# Patient Record
Sex: FEMALE | Race: WHITE | NOT HISPANIC OR LATINO | Employment: PART TIME | URBAN - METROPOLITAN AREA
[De-identification: names, ages, dates, MRNs, and addresses within clinical notes are randomized per-mention and may not be internally consistent; named-entity substitution may affect disease eponyms.]

---

## 2017-10-14 ENCOUNTER — HOSPITAL ENCOUNTER (EMERGENCY)
Facility: HOSPITAL | Age: 22
Discharge: HOME | End: 2017-10-14
Attending: EMERGENCY MEDICINE | Admitting: EMERGENCY MEDICINE

## 2017-10-14 ENCOUNTER — APPOINTMENT (OUTPATIENT)
Dept: GENERAL RADIOLOGY | Facility: HOSPITAL | Age: 22
End: 2017-10-14

## 2017-10-14 VITALS
RESPIRATION RATE: 16 BRPM | SYSTOLIC BLOOD PRESSURE: 114 MMHG | BODY MASS INDEX: 20.89 KG/M2 | WEIGHT: 130 LBS | HEART RATE: 78 BPM | DIASTOLIC BLOOD PRESSURE: 72 MMHG | HEIGHT: 66 IN | OXYGEN SATURATION: 100 % | TEMPERATURE: 98 F

## 2017-10-14 DIAGNOSIS — S83.92XA SPRAIN OF LEFT KNEE, UNSPECIFIED LIGAMENT, INITIAL ENCOUNTER: Primary | ICD-10-CM

## 2017-10-14 PROCEDURE — 73560 X-RAY EXAM OF KNEE 1 OR 2: CPT

## 2017-10-14 PROCEDURE — 99284 EMERGENCY DEPT VISIT MOD MDM: CPT

## 2017-10-14 RX ORDER — HYDROCODONE BITARTRATE AND ACETAMINOPHEN 7.5; 325 MG/1; MG/1
1 TABLET ORAL ONCE
Status: COMPLETED | OUTPATIENT
Start: 2017-10-14 | End: 2017-10-14

## 2017-10-14 RX ORDER — TRAZODONE HYDROCHLORIDE 100 MG/1
100 TABLET ORAL NIGHTLY
COMMUNITY
End: 2023-03-10

## 2017-10-14 RX ORDER — CLONAZEPAM 1 MG/1
1 TABLET ORAL 3 TIMES DAILY PRN
COMMUNITY
End: 2023-03-10

## 2017-10-14 RX ORDER — NORETHINDRONE ACETATE AND ETHINYL ESTRADIOL AND FERROUS FUMARATE 5-7-9-7
KIT ORAL DAILY
COMMUNITY
End: 2023-03-10

## 2017-10-14 RX ADMIN — HYDROCODONE BITARTRATE AND ACETAMINOPHEN 1 TABLET: 7.5; 325 TABLET ORAL at 12:34

## 2017-10-14 NOTE — DISCHARGE INSTRUCTIONS
Ibuprofen as needed for pain  Rest,ice, elevate and wear knee immobilizer for 3-5 days  Follow up with Orthopedic md of choice in 5-7 days if symptoms not improving  Return to er for increased pain/swelling or any new or worsening symptoms

## 2017-10-14 NOTE — ED PROVIDER NOTES
"Pt presents to the ED complaining of medial left knee pain, which began last night while at work. Pt states that she heard a \"pop\" in her left knee at the time. Pt's L knee XR shows nothing acute. On exam, the pt has no ligamentous laxity or left knee effusion but there is tenderness along the medial joint line of the left knee. I agree with the plan to discharge the pt home in a knee immobilizer.    I supervised care provided by the midlevel provider.    We have discussed this patient's history, physical exam, and treatment plan.   I have reviewed the note and personally saw and examined the patient and agree with the plan of care.    Documentation assistance provided by kevin Tucker for Dr. Beasley.  Information recorded by the venuibe was done at my direction and has been verified and validated by me.       Desi Tucker  10/14/17 5751       Imer Beasley MD  10/14/17 2342    "

## 2017-10-14 NOTE — ED PROVIDER NOTES
"  EMERGENCY DEPARTMENT ENCOUNTER    CHIEF COMPLAINT  Chief Complaint: L knee pain  History given by:Pt  History limited by:Nothing  Room Number: 03/03  PMD: Julianna Pollock MD      HPI:  Pt is a 22 y.o. female who presents with L knee pain secondary falling off of a mechanical bull last night. She reports she heard a \"pop\" after she fell but was able to ambulate afterwards. After helping about three people onto the mechanical bull which involved people standing on her L leg, she reports she heard another \"pop\". Pt reports her pain is exacerbated with applying weight on her L leg and movement.  Patient denies lower extremity numbness or tingling or any other symptoms at this time.  Past Medical History of anxiety and depression.    Duration: Last night  Timing:sudden  Location:L knee  Radiation:none  Quality:\"pain\"  Intensity/Severity:moderate  Progression:unchanged  Associated Symptoms:none  Aggravating Factors:weight bearing, movement  Alleviating Factors:none  Previous Episodes:none  Treatment before arrival: Pt received no treatment PTA.    PAST MEDICAL HISTORY  Active Ambulatory Problems     Diagnosis Date Noted   • No Active Ambulatory Problems     Resolved Ambulatory Problems     Diagnosis Date Noted   • No Resolved Ambulatory Problems     Past Medical History:   Diagnosis Date   • Anxiety    • Depression        PAST SURGICAL HISTORY  History reviewed. No pertinent surgical history.    FAMILY HISTORY  History reviewed. No pertinent family history.    SOCIAL HISTORY  Social History     Social History   • Marital status:      Spouse name: N/A   • Number of children: N/A   • Years of education: N/A     Occupational History   • Not on file.     Social History Main Topics   • Smoking status: Current Every Day Smoker   • Smokeless tobacco: Not on file   • Alcohol use Yes      Comment: socially   • Drug use: Not on file   • Sexual activity: Not on file     Other Topics Concern   • Not on file     Social " History Narrative   • No narrative on file         ALLERGIES  Review of patient's allergies indicates no known allergies.    REVIEW OF SYSTEMS  Review of Systems   Constitutional: Negative for chills and fever.   HENT: Negative for sore throat.    Respiratory: Negative for cough.    Gastrointestinal: Negative for nausea and vomiting.   Genitourinary: Negative for dysuria.   Musculoskeletal: Positive for arthralgias (L knee). Negative for back pain.   Psychiatric/Behavioral: The patient is not nervous/anxious.        PHYSICAL EXAM  ED Triage Vitals   Temp Heart Rate Resp BP SpO2   10/14/17 1108 10/14/17 1108 10/14/17 1108 10/14/17 1122 10/14/17 1108   98.5 °F (36.9 °C) 121 18 127/84 99 %      Temp src Heart Rate Source Patient Position BP Location FiO2 (%)   10/14/17 1108 10/14/17 1108 10/14/17 1122 10/14/17 1122 --   Tympanic Monitor Sitting Right arm        Physical Exam   Constitutional: She is well-developed, well-nourished, and in no distress.   HENT:   Head: Normocephalic.   Mouth/Throat: Mucous membranes are normal.   Eyes: No scleral icterus.   Neck: Normal range of motion.   Cardiovascular: Normal rate, regular rhythm and normal heart sounds.    Pulses:       Dorsalis pedis pulses are 2+ on the right side, and 2+ on the left side.        Posterior tibial pulses are 2+ on the right side, and 2+ on the left side.   Pulmonary/Chest: Effort normal and breath sounds normal.   Musculoskeletal:        Left knee: She exhibits decreased range of motion (but able to lift left from the stretcher). Tenderness found. Lateral joint line tenderness noted.   Neurological: She is alert.   Skin: Skin is warm and dry.   Psychiatric: Mood and affect normal.   Nursing note and vitals reviewed.      RADIOLOGY  XR Knee 1 or 2 View Left   Narrative:       TWO-VIEW LEFT KNEE     HISTORY: Injury. Pain.     FINDINGS: The joint space is well-maintained and there is no evidence of  fracture or joint effusion.                  I ordered  the above noted radiological studies and reviewed the images on the PACS system.      PROCEDURES    Splint Application  Time: 1357  Location:L knee   Splint Type:Knee immobilizer   The splinted body part was neurovascularly unchanged and is in good alignment following the procedure.  Patient tolerated the procedure well with no immediate complications.    PROGRESS AND CONSULTS    1300 Reviewed pt's history and workup with Dr. Beasley.  At bedside evaluation, they agree with the plan of care.    1356 Informed pt of XR which show no acute fraction. Informed pt of possible L knee sprain. Suggested elevation and placing ice to the effected area. Will discharge. Reviewed implications of results, diagnosis, meds, responsibility to follow up, warning signs and symptoms of possible worsening, potential complications and reasons to return to ER with patient.  Discussed all results and noted any abnormalities with patient.  Discussed absolute need to recheck abnormalities with new or worsening symptoms.    Discussed plan for discharge, as there is no emergent indication for admission.  Pt is agreeable and understands need for follow up and repeat testing.  Pt is aware that discharge does not mean that nothing is wrong but it indicates no emergency is present.  Pt is discharged with instructions to follow up with primary care doctor to have their blood pressure rechecked.       DIAGNOSIS  Final diagnoses:   Sprain of left knee, unspecified ligament, initial encounter       FOLLOW UP   Julianna Pollock MD  [id number]  [place] [address]  [phone]    Call in 2 days      Garrick Omer MD  [address] [facility]  [address]  [place] [address]  [phone]    Schedule an appointment as soon as possible for a visit in 1 week  If symptoms worsen        COURSE & MEDICAL DECISION MAKING  Pertinent Imaging studies that were ordered and reviewed are noted above.  Results were reviewed/discussed with the patient and they  "were also made aware of online assess.     MEDICATIONS GIVEN IN ER  Medications   HYDROcodone-acetaminophen (NORCO) 7.5-325 MG per tablet 1 tablet (1 tablet Oral Given 10/14/17 1234)       /73 (BP Location: Right arm, Patient Position: Sitting)  Pulse 88  Temp 98.5 °F (36.9 °C) (Tympanic)   Resp 16  Ht 66\" (167.6 cm)  Wt 130 lb (59 kg)  SpO2 99%  BMI 20.98 kg/m2      I personally reviewed the past medical history, past surgical history, social history, family history, current medications and allergies as they appear in this chart.  The scribe's note accurately reflects the work and decisions made by me.     Documentation assistance provided by kevin Mcdaniel for LINO Hodge on 10/14/2017 at 12:00 PM. Information recorded by the scribe was done at my direction and has been verified and validated by me.     Chrissy Mcdaniel  10/14/17 1401       Lucille Reynolds, NICHOLE  10/14/17 1524    "

## 2017-10-14 NOTE — ED NOTES
"Pt c/o left knee pain after falling off of a mechanical bull at work last night and hearing a \"pop\". Pt states after that, she was helping 3 people onto it and by allowing them to step up onto her leg and onto the bull and she felt another \"pop\".     Nina Butt RN  10/14/17 1124    "

## 2021-11-27 ENCOUNTER — IMMUNIZATION (OUTPATIENT)
Dept: VACCINE CLINIC | Facility: HOSPITAL | Age: 26
End: 2021-11-27

## 2021-11-27 PROCEDURE — 91300 HC SARSCOV02 VAC 30MCG/0.3ML IM: CPT | Performed by: INTERNAL MEDICINE

## 2021-11-27 PROCEDURE — 0004A ADM SARSCOV2 30MCG/0.3ML BOOSTER: CPT | Performed by: INTERNAL MEDICINE

## 2023-03-10 ENCOUNTER — OFFICE VISIT (OUTPATIENT)
Dept: NEUROLOGY | Facility: CLINIC | Age: 28
End: 2023-03-10
Payer: COMMERCIAL

## 2023-03-10 VITALS
OXYGEN SATURATION: 98 % | BODY MASS INDEX: 22.11 KG/M2 | HEART RATE: 84 BPM | HEIGHT: 66 IN | WEIGHT: 137.6 LBS | SYSTOLIC BLOOD PRESSURE: 116 MMHG | DIASTOLIC BLOOD PRESSURE: 72 MMHG

## 2023-03-10 DIAGNOSIS — G43.719 INTRACTABLE CHRONIC MIGRAINE WITHOUT AURA AND WITHOUT STATUS MIGRAINOSUS: Primary | ICD-10-CM

## 2023-03-10 PROCEDURE — 99204 OFFICE O/P NEW MOD 45 MIN: CPT | Performed by: PSYCHIATRY & NEUROLOGY

## 2023-03-10 RX ORDER — GUANFACINE 2 MG/1
TABLET ORAL
COMMUNITY
Start: 2023-02-28

## 2023-03-10 RX ORDER — LAMOTRIGINE 25 MG/1
TABLET ORAL
COMMUNITY
Start: 2023-02-28

## 2023-03-10 RX ORDER — CLONAZEPAM 0.5 MG/1
TABLET ORAL
COMMUNITY
Start: 2023-02-07

## 2023-03-10 RX ORDER — ZIPRASIDONE HYDROCHLORIDE 60 MG/1
CAPSULE ORAL
COMMUNITY
Start: 2022-09-09

## 2023-03-10 RX ORDER — RIZATRIPTAN BENZOATE 10 MG/1
10 TABLET ORAL ONCE AS NEEDED
Qty: 12 TABLET | Refills: 2 | Status: SHIPPED | OUTPATIENT
Start: 2023-03-10

## 2023-03-10 NOTE — ASSESSMENT & PLAN NOTE
27 year old right handed woman with chronic migraines which have responded well to Botox injections for migraines.  She reports a history of migraines starting in 2021.  She would lose vision and then fade back in depending on how long the ocular migraines lasted.  She tells me she started getting Botox injections for migraines which are very helpful.  Of note she has been tried on topiramate, trazodone and is currently on lamotrigine.  She has also tried sumatriptan for acute treatment of her migraines which makes her sleepy but does help with her headaches.  She has had normal brain MRI and MRA in 8/2021.  She reports having her eyes checked by ophthalmology who also felt that she was having migraine auras.  She would like to continue with the Botox for migraines which have been extremely helpful at getting rid of her migraines.  Her headaches were located behind her eyes and behind her forehead and around her head with throbbing quality and sharp at times with light and sound sensitivity without too much nausea and no vomiting but along with dizziness and feeling lightheaded with her migraines.  There is family history of migraines in her sister.  She was having daily migraines before starting the Botox injections and she was missing a lot of work.  Her last set of Botox injections were on 12/6/2022.  I will set her up for Botox injections again.  I will also switch her from the sumatriptan to rizatriptan to see if she does better with regards to side effects of making her drowsy.  Discussed migraine triggers and lifestyle modifications and provided patient education information on her visit today.

## 2023-03-10 NOTE — PROGRESS NOTES
Chief Complaint  Chronic migraines    Subjective          Colleen Walker presents to [facility] for   HISTORY OF PRESENT ILLNESS:    Colleen Walker is a 27 year old right handed woman who presents to neurology clinic for initial evaluation and treatment of migraines.  She reports a history of migraines starting in 2021.  She would lose vision and then fade back in depending on how long the ocular migraines lasted.  She tells me she started getting Botox injections for migraines which are very helpful.  Of note she has been tried on topiramate, trazodone and is currently on lamotrigine.  She has also tried sumatriptan for acute treatment of her migraines which makes her sleepy but does help with her headaches.  She has had normal brain MRI and MRA in 8/2021.  She reports having her eyes checked by ophthalmology who also felt that she was having migraine auras.  She would like to continue with the Botox for migraines which have been extremely helpful at getting rid of her migraines.  Her headaches were located behind her eyes and behind her forehead and around her head with throbbing quality and sharp at times with light and sound sensitivity without too much nausea and no vomiting but along with dizziness and feeling lightheaded with her migraines.  There is family history of migraines in her sister.  She was having daily migraines before starting the Botox injections and she was missing a lot of work.  She has noticed triggers for her migraines including stress and barometric pressure changes with weather changes and light.      Past Medical History:   Diagnosis Date   • Anxiety    • Depression    • Head injury 2010    Concussion   • Headache, tension-type 2021   • Migraine 2021    Aura Migraines        Family History   Problem Relation Age of Onset   • Hyperlipidemia Mother         Social History     Socioeconomic History   • Marital status:    Tobacco Use   • Smoking status: Former      Types: Cigarettes, Electronic Cigarette     Quit date: 2022     Years since quittin.5   Substance and Sexual Activity   • Alcohol use: Yes     Alcohol/week: 6.0 standard drinks     Types: 3 Glasses of wine, 3 Drinks containing 0.5 oz of alcohol per week     Comment: socially   • Drug use: Yes     Frequency: 7.0 times per week     Types: Marijuana   • Sexual activity: Yes     Partners: Male     Birth control/protection: I.U.D.        I have reviewed and confirmed the accuracy of the ROS as documented by the MA/LPN/RN Nicki Barone MD    Review of Systems   Constitutional: Negative for activity change, appetite change, fatigue, unexpected weight gain and unexpected weight loss.   HENT: Negative for dental problem, ear pain, hearing loss, sinus pressure, sneezing and trouble swallowing.    Eyes: Negative for blurred vision, double vision, pain and visual disturbance.   Respiratory: Negative for cough, chest tightness, shortness of breath and wheezing.    Cardiovascular: Negative for chest pain, palpitations and leg swelling.   Gastrointestinal: Negative for abdominal pain, constipation, diarrhea, nausea and vomiting.   Endocrine: Negative for cold intolerance and heat intolerance.   Genitourinary: Negative for breast discharge, difficulty urinating, frequency, urgency, urinary incontinence and vaginal pain.   Musculoskeletal: Negative for arthralgias, back pain, gait problem, myalgias, neck pain and neck stiffness.   Skin: Negative for color change, rash, skin lesions, wound and bruise.   Allergic/Immunologic: Negative for environmental allergies, food allergies and immunocompromised state.   Neurological: Positive for weakness, light-headedness and headache. Negative for dizziness, speech difficulty and numbness.   Hematological: Does not bruise/bleed easily.   Psychiatric/Behavioral: Positive for sleep disturbance. Negative for behavioral problems, decreased concentration, negative for hyperactivity and  "stress. The patient is nervous/anxious.         Objective   Vital Signs:   /72   Pulse 84   Ht 167.6 cm (66\")   Wt 62.4 kg (137 lb 9.6 oz)   SpO2 98%   BMI 22.21 kg/m²       PHYSICAL EXAM:    General   Mental Status - Alert. General Appearance - Well developed, Well groomed, Oriented and Cooperative. Orientation - Oriented X3.       Head and Neck  Head - normocephalic, atraumatic with no lesions or palpable masses.  Neck    Global Assessment - supple.       Eye   Sclera/Conjunctiva - Bilateral - Normal.    ENMT  Mouth and Throat   Oral Cavity/Oropharynx: Oropharynx - the soft palate,uvula and tongue are normal in appearance.    Chest and Lung Exam   Chest - lung clear to auscultation bilaterally.    Cardiovascular   Cardiovascular examination reveals  - normal heart sounds, regular rate and rhythm.    Neurologic   Mental Status: Speech - Normal. Cognitive function - appropriate fund of knowledge. No impairment of attention, Impairment of concentration, impairment of long term memory or impairment of short term memory.  Cranial Nerves:   II Optic: Visual acuity - Left - Normal. Right - Normal. Visual fields - Normal (to confrontation).  III Oculomotor: Pupillary constriction - Left - Normal. Right - Normal.  VII Facial: - Normal Bilaterally.   IX Glossopharyngeal / X Vagus - Normal.  XI Accessory: Trapezius - Bilateral - Normal. Sternocleidomastoid - Bilateral - Normal.  XII Hypoglossal - Bilateral - Normal.  Eye Movements: - Normal Bilaterally.  Sensory:   Light Touch: Intact - Globally.  Motor:   Bulk and Contour: - Normal.  Tone: - Normal.  Tremor: Not present.  Strength: 5/5 normal muscle strength - All Muscles.   General Assessment of Reflexes: - deep tendon reflexes are normal. Coordination - No Impairment of finger-to-nose or Impairment of rapid alternating movements. Gait - Normal.     Result Review :                 Assessment and Plan    Problem List Items Addressed This Visit        Neuro    " Intractable chronic migraine without aura - Primary    Current Assessment & Plan     27 year old right handed woman with chronic migraines which have responded well to Botox injections for migraines.  She reports a history of migraines starting in 2021.  She would lose vision and then fade back in depending on how long the ocular migraines lasted.  She tells me she started getting Botox injections for migraines which are very helpful.  Of note she has been tried on topiramate, trazodone and is currently on lamotrigine.  She has also tried sumatriptan for acute treatment of her migraines which makes her sleepy but does help with her headaches.  She has had normal brain MRI and MRA in 8/2021.  She reports having her eyes checked by ophthalmology who also felt that she was having migraine auras.  She would like to continue with the Botox for migraines which have been extremely helpful at getting rid of her migraines.  Her headaches were located behind her eyes and behind her forehead and around her head with throbbing quality and sharp at times with light and sound sensitivity without too much nausea and no vomiting but along with dizziness and feeling lightheaded with her migraines.  There is family history of migraines in her sister.  She was having daily migraines before starting the Botox injections and she was missing a lot of work.  Her last set of Botox injections were on 12/6/2022.  I will set her up for Botox injections again.  I will also switch her from the sumatriptan to rizatriptan to see if she does better with regards to side effects of making her drowsy.  Discussed migraine triggers and lifestyle modifications and provided patient education information on her visit today.            Relevant Medications    lamoTRIgine (LaMICtal) 25 MG tablet    clonazePAM (KlonoPIN) 0.5 MG tablet    rizatriptan (Maxalt) 10 MG tablet     I spent 46 minutes caring for Colleen on this date of service. This time includes time  spent by me in the following activities:preparing for the visit, reviewing tests, obtaining and/or reviewing a separately obtained history, performing a medically appropriate examination and/or evaluation , counseling and educating the patient/family/caregiver, ordering medications, tests, or procedures, documenting information in the medical record and care coordination    Follow Up   Return in about 3 months (around 6/10/2023).  Patient was given instructions and counseling regarding her condition or for health maintenance advice. Please see specific information pulled into the AVS if appropriate.

## 2023-03-14 ENCOUNTER — PATIENT ROUNDING (BHMG ONLY) (OUTPATIENT)
Dept: NEUROLOGY | Facility: CLINIC | Age: 28
End: 2023-03-14
Payer: OTHER MISCELLANEOUS

## 2023-03-22 ENCOUNTER — TELEPHONE (OUTPATIENT)
Dept: NEUROLOGY | Facility: CLINIC | Age: 28
End: 2023-03-22

## 2023-04-05 ENCOUNTER — PROCEDURE VISIT (OUTPATIENT)
Dept: NEUROLOGY | Facility: CLINIC | Age: 28
End: 2023-04-05
Payer: COMMERCIAL

## 2023-04-05 DIAGNOSIS — G43.719 INTRACTABLE CHRONIC MIGRAINE WITHOUT AURA AND WITHOUT STATUS MIGRAINOSUS: Primary | ICD-10-CM

## 2023-04-05 PROCEDURE — 64615 CHEMODENERV MUSC MIGRAINE: CPT | Performed by: PSYCHIATRY & NEUROLOGY

## 2023-06-02 ENCOUNTER — TELEPHONE (OUTPATIENT)
Dept: NEUROLOGY | Facility: CLINIC | Age: 28
End: 2023-06-02

## 2023-06-02 NOTE — TELEPHONE ENCOUNTER
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 SHE KNOWS HER BOTOX NEEDS TO BE APPROVED BY HER INS COMPANY. SHE ASKS IF THE BOTOX CAN BE CHECKED FOR COVERAGE FROM HER NEW [employer].    SHE SAID TO CALL IF ANY QUESTIONS

## 2023-06-09 ENCOUNTER — OFFICE VISIT (OUTPATIENT)
Dept: NEUROLOGY | Facility: CLINIC | Age: 28
End: 2023-06-09
Payer: COMMERCIAL

## 2023-06-09 VITALS
OXYGEN SATURATION: 98 % | BODY MASS INDEX: 23.05 KG/M2 | HEART RATE: 74 BPM | WEIGHT: 143.4 LBS | HEIGHT: 66 IN | SYSTOLIC BLOOD PRESSURE: 120 MMHG | DIASTOLIC BLOOD PRESSURE: 74 MMHG

## 2023-06-09 DIAGNOSIS — G43.719 INTRACTABLE CHRONIC MIGRAINE WITHOUT AURA AND WITHOUT STATUS MIGRAINOSUS: Primary | ICD-10-CM

## 2023-06-09 PROCEDURE — 99213 OFFICE O/P EST LOW 20 MIN: CPT | Performed by: PSYCHIATRY & NEUROLOGY

## 2023-06-09 RX ORDER — SUMATRIPTAN 100 MG/1
100 TABLET, FILM COATED ORAL ONCE AS NEEDED
Qty: 12 TABLET | Refills: 2 | Status: SHIPPED | OUTPATIENT
Start: 2023-06-09

## 2023-06-09 RX ORDER — BUSPIRONE HYDROCHLORIDE 5 MG/1
TABLET ORAL
COMMUNITY
Start: 2023-05-23

## 2023-06-09 NOTE — PROGRESS NOTES
Chief Complaint  Migraine    Subjective          Colleen Walker presents to [facility] NEUROLOGY for   HISTORY OF PRESENT ILLNESS:    Colleen Walker is a 28 year old right handed woman who returns to neurology clinic for follow up evaluation and treatment of migraines.  She reports a history of migraines starting in 2021.  She would lose vision and then fade back in depending on how long the ocular migraines lasted.  She tells me she started getting Botox injections for migraines which are very helpful.  Of note she has been tried on topiramate, trazodone and is currently on lamotrigine.  She has also tried sumatriptan for acute treatment of her migraines which makes her sleepy but does help with her headaches.  She has had normal brain MRI and MRA in 8/2021.  She reports having her eyes checked by ophthalmology who also felt that she was having migraine auras.  She would like to continue with the Botox for migraines which have been extremely helpful at getting rid of her migraines.  Her headaches were located behind her eyes and behind her forehead and around her head with throbbing quality and sharp at times with light and sound sensitivity without too much nausea and no vomiting but along with dizziness and feeling lightheaded with her migraines.  There is family history of migraines in her sister.  She was having daily migraines before starting the Botox injections and she was missing a lot of work.  She has noticed triggers for her migraines including stress and barometric pressure changes with weather changes and light.   She tells me today that the Botox is very helpful with reduction of 80%+ and rizatriptan is not as helpful acutely as sumatriptan so she would like to change to sumatriptan today.     Past Medical History:   Diagnosis Date    Anxiety     Depression     Head injury 2010    Concussion    Headache, tension-type 2021    Migraine 2021    Aura Migraines        Family History  "  Problem Relation Age of Onset    Hyperlipidemia Mother         Social History     Socioeconomic History    Marital status:    Tobacco Use    Smoking status: Former     Types: Cigarettes, Electronic Cigarette     Quit date: 2022     Years since quittin.7   Vaping Use    Vaping Use: Former   Substance and Sexual Activity    Alcohol use: Yes     Alcohol/week: 6.0 standard drinks     Types: 3 Glasses of wine, 3 Drinks containing 0.5 oz of alcohol per week     Comment: socially    Drug use: Yes     Frequency: 7.0 times per week     Types: Marijuana    Sexual activity: Yes     Partners: Male     Birth control/protection: I.U.D.        I have reviewed and confirmed the accuracy of the ROS as documented by the MA/LPN/RN Nicki Barone MD   Review of Systems   Neurological:  Positive for headache. Negative for dizziness, tremors, seizures, syncope, facial asymmetry, speech difficulty, weakness, light-headedness, numbness, memory problem and confusion.   Psychiatric/Behavioral:  Negative for agitation, behavioral problems, decreased concentration, dysphoric mood, hallucinations, self-injury, sleep disturbance, suicidal ideas, negative for hyperactivity, depressed mood and stress. The patient is not nervous/anxious.       Objective   Vital Signs:   /74   Pulse 74   Ht 168.6 cm (66.38\")   Wt 65 kg (143 lb 6.4 oz)   SpO2 98%   BMI 22.88 kg/m²       PHYSICAL EXAM:    General   Mental Status - Alert. General Appearance - Well developed, Well groomed, Oriented and Cooperative. Orientation - Oriented X3.       Head and Neck  Head - normocephalic, atraumatic with no lesions or palpable masses.  Neck    Global Assessment - supple.       Eye   Sclera/Conjunctiva - Bilateral - Normal.    ENMT  Mouth and Throat   Oral Cavity/Oropharynx: Oropharynx - the soft palate,uvula and tongue are normal in appearance.    Chest and Lung Exam   Chest - lung clear to auscultation bilaterally.    Cardiovascular "   Cardiovascular examination reveals  - normal heart sounds, regular rate and rhythm.    Neurologic   Mental Status: Speech - Normal. Cognitive function - appropriate fund of knowledge. No impairment of attention, Impairment of concentration, impairment of long term memory or impairment of short term memory.  Cranial Nerves:   II Optic: Visual acuity - Left - Normal. Right - Normal. Visual fields - Normal (to confrontation).  III Oculomotor: Pupillary constriction - Left - Normal. Right - Normal.  VII Facial: - Normal Bilaterally.   IX Glossopharyngeal / X Vagus - Normal.  XI Accessory: Trapezius - Bilateral - Normal. Sternocleidomastoid - Bilateral - Normal.  XII Hypoglossal - Bilateral - Normal.  Eye Movements: - Normal Bilaterally.  Sensory:   Light Touch: Intact - Globally.  Motor:   Bulk and Contour: - Normal.  Tone: - Normal.  Tremor: Not present.  Strength: 5/5 normal muscle strength - All Muscles.   General Assessment of Reflexes: - deep tendon reflexes are normal. Coordination - No Impairment of finger-to-nose or Impairment of rapid alternating movements. Gait - Normal.         Result Review :                 Assessment and Plan    Problem List Items Addressed This Visit          Neuro    Intractable chronic migraine without aura - Primary    Current Assessment & Plan     28 year old right handed woman with chronic medically refractory migraines which have responded well to combination of Botox for migraine prevention and sumatriptan for acute treatment.  She reports a history of migraines starting in 2021.  She would lose vision and then fade back in depending on how long the ocular migraines lasted.  She tells me she started getting Botox injections for migraines which are very helpful.  Of note she has been tried on topiramate, trazodone and is currently on lamotrigine.  She has also tried sumatriptan for acute treatment of her migraines which makes her sleepy but does help with her headaches.  She has  had normal brain MRI and MRA in 8/2021.  She reports having her eyes checked by ophthalmology who also felt that she was having migraine auras.  She would like to continue with the Botox for migraines which have been extremely helpful at getting rid of her migraines.  Her headaches were located behind her eyes and behind her forehead and around her head with throbbing quality and sharp at times with light and sound sensitivity without too much nausea and no vomiting but along with dizziness and feeling lightheaded with her migraines.  There is family history of migraines in her sister.  She was having daily migraines before starting the Botox injections and she was missing a lot of work.  She has noticed triggers for her migraines including stress and barometric pressure changes with weather changes and light.   She tells me today that the Botox is very helpful with reduction of 80%+ and rizatriptan is not as helpful acutely as sumatriptan so she would like to change to sumatriptan today. I will stop the rizatriptan and instead prescribe the sumatriptan for acute treatment.          Relevant Medications    SUMAtriptan (IMITREX) 100 MG tablet       Follow Up   Return in about 3 months (around 9/9/2023).  Patient was given instructions and counseling regarding her condition or for health maintenance advice. Please see specific information pulled into the AVS if appropriate.

## 2023-06-09 NOTE — LETTER
June 9, 2023     Julianna Pollock MD  [id number]  [facility] [address]    Patient: Colleen Walker   YOB: 1995   Date of Visit: 6/9/2023       Dear Dr. Maris MD:    Thank you for referring Colleen Walker to me for evaluation. Below are the relevant portions of my assessment and plan of care.    If you have questions, please do not hesitate to call me. I look forward to following Colleen along with you.         Sincerely,        Nicki Barone MD        CC: No Recipients    Nicki Barone MD  06/09/23 1321  Sign when Signing Visit  Chief Complaint  Migraine    Subjective          Colleen Walker presents to [facility] NEUROLOGY for   HISTORY OF PRESENT ILLNESS:    Colleen Walker is a 28 year old right handed woman who returns to neurology clinic for follow up evaluation and treatment of migraines.  She reports a history of migraines starting in 2021.  She would lose vision and then fade back in depending on how long the ocular migraines lasted.  She tells me she started getting Botox injections for migraines which are very helpful.  Of note she has been tried on topiramate, trazodone and is currently on lamotrigine.  She has also tried sumatriptan for acute treatment of her migraines which makes her sleepy but does help with her headaches.  She has had normal brain MRI and MRA in 8/2021.  She reports having her eyes checked by ophthalmology who also felt that she was having migraine auras.  She would like to continue with the Botox for migraines which have been extremely helpful at getting rid of her migraines.  Her headaches were located behind her eyes and behind her forehead and around her head with throbbing quality and sharp at times with light and sound sensitivity without too much nausea and no vomiting but along with dizziness and feeling lightheaded with her migraines.  There is family history of migraines in her sister.  She was having daily migraines before  "starting the Botox injections and she was missing a lot of work.  She has noticed triggers for her migraines including stress and barometric pressure changes with weather changes and light.   She tells me today that the Botox is very helpful with reduction of 80%+ and rizatriptan is not as helpful acutely as sumatriptan so she would like to change to sumatriptan today.     Past Medical History:   Diagnosis Date   • Anxiety    • Depression    • Head injury     Concussion   • Headache, tension-type    • Migraine     Aura Migraines        Family History   Problem Relation Age of Onset   • Hyperlipidemia Mother         Social History     Socioeconomic History   • Marital status:    Tobacco Use   • Smoking status: Former     Types: Cigarettes, Electronic Cigarette     Quit date: 2022     Years since quittin.7   Vaping Use   • Vaping Use: Former   Substance and Sexual Activity   • Alcohol use: Yes     Alcohol/week: 6.0 standard drinks     Types: 3 Glasses of wine, 3 Drinks containing 0.5 oz of alcohol per week     Comment: socially   • Drug use: Yes     Frequency: 7.0 times per week     Types: Marijuana   • Sexual activity: Yes     Partners: Male     Birth control/protection: I.U.D.        I have reviewed and confirmed the accuracy of the ROS as documented by the MA/LPN/RN Nicki Barone MD   Review of Systems   Neurological:  Positive for headache. Negative for dizziness, tremors, seizures, syncope, facial asymmetry, speech difficulty, weakness, light-headedness, numbness, memory problem and confusion.   Psychiatric/Behavioral:  Negative for agitation, behavioral problems, decreased concentration, dysphoric mood, hallucinations, self-injury, sleep disturbance, suicidal ideas, negative for hyperactivity, depressed mood and stress. The patient is not nervous/anxious.       Objective   Vital Signs:   /74   Pulse 74   Ht 168.6 cm (66.38\")   Wt 65 kg (143 lb 6.4 oz)   SpO2 98%   BMI " 22.88 kg/m²       PHYSICAL EXAM:    General   Mental Status - Alert. General Appearance - Well developed, Well groomed, Oriented and Cooperative. Orientation - Oriented X3.       Head and Neck  Head - normocephalic, atraumatic with no lesions or palpable masses.  Neck    Global Assessment - supple.       Eye   Sclera/Conjunctiva - Bilateral - Normal.    ENMT  Mouth and Throat   Oral Cavity/Oropharynx: Oropharynx - the soft palate,uvula and tongue are normal in appearance.    Chest and Lung Exam   Chest - lung clear to auscultation bilaterally.    Cardiovascular   Cardiovascular examination reveals  - normal heart sounds, regular rate and rhythm.    Neurologic   Mental Status: Speech - Normal. Cognitive function - appropriate fund of knowledge. No impairment of attention, Impairment of concentration, impairment of long term memory or impairment of short term memory.  Cranial Nerves:   II Optic: Visual acuity - Left - Normal. Right - Normal. Visual fields - Normal (to confrontation).  III Oculomotor: Pupillary constriction - Left - Normal. Right - Normal.  VII Facial: - Normal Bilaterally.   IX Glossopharyngeal / X Vagus - Normal.  XI Accessory: Trapezius - Bilateral - Normal. Sternocleidomastoid - Bilateral - Normal.  XII Hypoglossal - Bilateral - Normal.  Eye Movements: - Normal Bilaterally.  Sensory:   Light Touch: Intact - Globally.  Motor:   Bulk and Contour: - Normal.  Tone: - Normal.  Tremor: Not present.  Strength: 5/5 normal muscle strength - All Muscles.   General Assessment of Reflexes: - deep tendon reflexes are normal. Coordination - No Impairment of finger-to-nose or Impairment of rapid alternating movements. Gait - Normal.         Result Review :                 Assessment and Plan    Problem List Items Addressed This Visit          Neuro    Intractable chronic migraine without aura - Primary    Current Assessment & Plan     28 year old right handed woman with chronic medically refractory migraines  which have responded well to combination of Botox for migraine prevention and sumatriptan for acute treatment.  She reports a history of migraines starting in 2021.  She would lose vision and then fade back in depending on how long the ocular migraines lasted.  She tells me she started getting Botox injections for migraines which are very helpful.  Of note she has been tried on topiramate, trazodone and is currently on lamotrigine.  She has also tried sumatriptan for acute treatment of her migraines which makes her sleepy but does help with her headaches.  She has had normal brain MRI and MRA in 8/2021.  She reports having her eyes checked by ophthalmology who also felt that she was having migraine auras.  She would like to continue with the Botox for migraines which have been extremely helpful at getting rid of her migraines.  Her headaches were located behind her eyes and behind her forehead and around her head with throbbing quality and sharp at times with light and sound sensitivity without too much nausea and no vomiting but along with dizziness and feeling lightheaded with her migraines.  There is family history of migraines in her sister.  She was having daily migraines before starting the Botox injections and she was missing a lot of work.  She has noticed triggers for her migraines including stress and barometric pressure changes with weather changes and light.   She tells me today that the Botox is very helpful with reduction of 80%+ and rizatriptan is not as helpful acutely as sumatriptan so she would like to change to sumatriptan today. I will stop the rizatriptan and instead prescribe the sumatriptan for acute treatment.          Relevant Medications    SUMAtriptan (IMITREX) 100 MG tablet       Follow Up   Return in about 3 months (around 9/9/2023).  Patient was given instructions and counseling regarding her condition or for health maintenance advice. Please see specific information pulled into the AVS  if appropriate.

## 2023-06-09 NOTE — ASSESSMENT & PLAN NOTE
28 year old right handed woman with chronic medically refractory migraines which have responded well to combination of Botox for migraine prevention and sumatriptan for acute treatment.  She reports a history of migraines starting in 2021.  She would lose vision and then fade back in depending on how long the ocular migraines lasted.  She tells me she started getting Botox injections for migraines which are very helpful.  Of note she has been tried on topiramate, trazodone and is currently on lamotrigine.  She has also tried sumatriptan for acute treatment of her migraines which makes her sleepy but does help with her headaches.  She has had normal brain MRI and MRA in 8/2021.  She reports having her eyes checked by ophthalmology who also felt that she was having migraine auras.  She would like to continue with the Botox for migraines which have been extremely helpful at getting rid of her migraines.  Her headaches were located behind her eyes and behind her forehead and around her head with throbbing quality and sharp at times with light and sound sensitivity without too much nausea and no vomiting but along with dizziness and feeling lightheaded with her migraines.  There is family history of migraines in her sister.  She was having daily migraines before starting the Botox injections and she was missing a lot of work.  She has noticed triggers for her migraines including stress and barometric pressure changes with weather changes and light.   She tells me today that the Botox is very helpful with reduction of 80%+ and rizatriptan is not as helpful acutely as sumatriptan so she would like to change to sumatriptan today. I will stop the rizatriptan and instead prescribe the sumatriptan for acute treatment.

## 2023-07-25 ENCOUNTER — TELEPHONE (OUTPATIENT)
Dept: NEUROLOGY | Facility: CLINIC | Age: 28
End: 2023-07-25
Payer: COMMERCIAL

## 2023-07-25 NOTE — TELEPHONE ENCOUNTER
PT CALLED [employer] FROM OFFICE CALLED SAID INS WAS APPROVED FOR BOTOX? HER INS WAS OUTDATED ON CHART? I UPDATED INS AND CALL OFFICE TO SEE IF APPROVED FOR BOTOX AND TO SCHED. TALKED TO [employer].

## 2023-07-25 NOTE — TELEPHONE ENCOUNTER
Kiran Wood,    Pt called to see if her Botox has been approved, I don't know Barry's last name or I would have sent this message to him.  Please reach out to pt.    Thanks,  Thalia

## 2023-07-26 ENCOUNTER — TELEPHONE (OUTPATIENT)
Dept: NEUROLOGY | Facility: CLINIC | Age: 28
End: 2023-07-26
Payer: COMMERCIAL

## 2023-07-26 NOTE — TELEPHONE ENCOUNTER
Called pt regarding MyChart request to r/s botox appt. Pt realized they were looking at different appt and kept appt as is.

## 2023-08-09 ENCOUNTER — PROCEDURE VISIT (OUTPATIENT)
Dept: NEUROLOGY | Facility: CLINIC | Age: 28
End: 2023-08-09
Payer: COMMERCIAL

## 2023-08-09 DIAGNOSIS — G43.719 INTRACTABLE CHRONIC MIGRAINE WITHOUT AURA AND WITHOUT STATUS MIGRAINOSUS: Primary | ICD-10-CM

## 2023-08-09 RX ORDER — PROPRANOLOL HYDROCHLORIDE 10 MG/1
TABLET ORAL
COMMUNITY
Start: 2023-08-04

## 2023-08-09 RX ORDER — HYDROXYZINE HYDROCHLORIDE 25 MG/1
TABLET, FILM COATED ORAL
COMMUNITY
Start: 2023-07-25

## 2023-08-09 NOTE — PROGRESS NOTES
Botox injection: Botox injection for neuromuscular block.  Indication: Chronic migraines.  Risks, benefits and alternatives were discussed with the patient.  EMG guidance was not used in identifying the injection site.  Procerus: 5 units was injected.  : 5 units was injected on the right and 5 units injected on the left.  Frontalis: 10 units injected on the right and 10 units injected on the left.  Temporalis: 20 units injected on the right and 20 units injected on the left.  Occipitalis: 15 units injected on the right and 15 units injected on the left.  Cervical paraspinal: 10 units injected on the right and 10 units injected on the left.  Trapezius: 15 units injected on the right and 15 units injected on the left.  200 unit vial, 1 vials, 45 wasted and 155 used.  The patient tolerated the procedure well.  There were no complications.  Patient instructions: The patient was instructed that they may experience localized discomfort over the next 12 to 24 hours and may take over the counter pain medication if needed.  Follow-up in the office in 3 months for next Botox injection.      Buy and bill.

## 2023-09-12 ENCOUNTER — TELEMEDICINE (OUTPATIENT)
Dept: NEUROLOGY | Facility: CLINIC | Age: 28
End: 2023-09-12
Payer: COMMERCIAL

## 2023-09-12 DIAGNOSIS — G43.719 INTRACTABLE CHRONIC MIGRAINE WITHOUT AURA AND WITHOUT STATUS MIGRAINOSUS: Primary | ICD-10-CM

## 2023-09-12 PROCEDURE — 99213 OFFICE O/P EST LOW 20 MIN: CPT | Performed by: PSYCHIATRY & NEUROLOGY

## 2023-09-12 NOTE — PROGRESS NOTES
Chief Complaint  Migraine    Subjective          Colleen Walker presents to [facility] for   HISTORY OF PRESENT ILLNESS:    Patient is being evaluated via Telehealth utilizing both Video and Audio.      Colleen Walker is a 28 year old right handed woman who is being evaluated via Telehealth with Audio and Video for follow up evaluation and treatment of migraines.  She reports a history of migraines starting in 2021.  She would lose vision and then fade back in depending on how long the ocular migraines lasted.  She tells me she started getting Botox injections for migraines which are very helpful.  Of note she has been tried on topiramate, trazodone and is currently on lamotrigine.  She has also tried sumatriptan for acute treatment of her migraines which makes her sleepy but does help with her headaches.  She has had normal brain MRI and MRA in 8/2021.  She reports having her eyes checked by ophthalmology who also felt that she was having migraine auras.  She would like to continue with the Botox for migraines which have been extremely helpful at getting rid of her migraines.  Her headaches were located behind her eyes and behind her forehead and around her head with throbbing quality and sharp at times with light and sound sensitivity without too much nausea and no vomiting but along with dizziness and feeling lightheaded with her migraines.  There is family history of migraines in her sister.  She was having daily migraines before starting the Botox injections and she was missing a lot of work.  She has noticed triggers for her migraines including stress and barometric pressure changes with weather changes and light.   She tells me today that the Botox is very helpful with reduction of 80%+ and sumatriptan 100 mg is helping more than the rizatriptan was in the past.  She is happy with current treatment of her migraines.      Past Medical History:   Diagnosis Date    Anxiety      Depression     Head injury 2010    Concussion    Headache, tension-type     Migraine     Aura Migraines        Family History   Problem Relation Age of Onset    Hyperlipidemia Mother         Social History     Socioeconomic History    Marital status:    Tobacco Use    Smoking status: Former     Types: Cigarettes, Electronic Cigarette     Quit date: 2022     Years since quittin.0   Vaping Use    Vaping Use: Former   Substance and Sexual Activity    Alcohol use: Yes     Alcohol/week: 6.0 standard drinks     Types: 3 Glasses of wine, 3 Drinks containing 0.5 oz of alcohol per week     Comment: socially    Drug use: Yes     Frequency: 7.0 times per week     Types: Marijuana    Sexual activity: Yes     Partners: Male     Birth control/protection: I.U.D.        I have personally reviewed the ROS as stated below.     Review of Systems     Neurological:  Positive for headache which are less frequent. Negative for dizziness, tremors, seizures, syncope, facial asymmetry, speech difficulty, weakness, light-headedness, numbness, memory problem and confusion.   Psychiatric/Behavioral:  Negative for agitation, behavioral problems, decreased concentration, dysphoric mood, hallucinations, self-injury, sleep disturbance, suicidal ideas, negative for hyperactivity, depressed mood and stress. The patient is not nervous/anxious.      Objective   Vital Signs Not obtained as this is a Telehealth Video Visit    PHYSICAL EXAM:    General   Mental Status - Alert. General Appearance - Well developed, Well groomed, Oriented and Cooperative. Orientation - Oriented X3.       Head and Neck  Head - normocephalic, atraumatic with no lesions or palpable masses.  Neck    Global Assessment - supple.       Eye   Sclera/Conjunctiva - Bilateral - Normal.    ENMT  Mouth and Throat   Oral Cavity/Oropharynx: Oropharynx - the soft palate,uvula and tongue are normal in appearance.    Neurologic   Mental Status: Speech - Normal.  Cognitive function - appropriate fund of knowledge. No impairment of attention, Impairment of concentration, impairment of long term memory or impairment of short term memory.  Cranial Nerves:   II Optic: Visual acuity - Left - Normal. Right - Normal.   VII Facial: - Normal Bilaterally.  VIII Acoustic - Bilateral - Hearing normal and (Hearing tested by finger rub).   IX Glossopharyngeal / X Vagus - Normal.  XI Accessory: Trapezius - Bilateral - Normal. Sternocleidomastoid - Bilateral - Normal.  XII Hypoglossal - Bilateral - Normal.  Eye Movements: - Normal Bilaterally.  Sensory:   Light Touch: Intact - Globally.  Motor:   Bulk and Contour: - Normal.  Tone: - Normal.  Tremor: Not present.  Strength: 5/5 normal muscle strength - All Muscles.   General Assessment: - Coordination - No Impairment of finger-to-nose or Impairment of rapid alternating movements. Gait - Normal.     Result Review :                 Assessment and Plan    Problem List Items Addressed This Visit          Neuro    Intractable chronic migraine without aura - Primary    Current Assessment & Plan     She tells me today that the Botox is very helpful for prevention with reduction of 80%+ and sumatriptan has been helpful acutely.  She is happy with current treatment which I will continue for patient.  Discussed migraine triggers and lifestyle modifications.  Informed her that we will need to stop these treatments if she were to get pregnant.  She is not planning on getting pregnant anytime soon.               Patient provided consent for Telehealth visit.      This was an audio and video enabled telemedicine encounter.     I performed this clinical encounter via real-time telehealth video connection originating from the patient's location at home and my location at [facility]. Verbal consent to participate in this telehealth video clinical visit was obtained and any questions by the patient regarding the interaction were answered.  This  visit occurred during the coronavirus (Covid-19) Public Health Emergency.       Follow Up   Return in about 4 months (around 1/12/2024).  Patient was given instructions and counseling regarding her condition or for health maintenance advice. Please see specific information pulled into the AVS if appropriate.

## 2023-09-12 NOTE — ASSESSMENT & PLAN NOTE
She tells me today that the Botox is very helpful for prevention with reduction of 80%+ and sumatriptan has been helpful acutely.  She is happy with current treatment which I will continue for patient.  Discussed migraine triggers and lifestyle modifications.  Informed her that we will need to stop these treatments if she were to get pregnant.  She is not planning on getting pregnant anytime soon.

## 2023-11-27 ENCOUNTER — TELEPHONE (OUTPATIENT)
Dept: NEUROLOGY | Facility: CLINIC | Age: 28
End: 2023-11-27

## 2023-11-27 NOTE — TELEPHONE ENCOUNTER
Please advise- Botox too expensive for pt out of pocket, looking for an alternative- pt cancelled upcoming appt d/t cost

## 2023-11-27 NOTE — TELEPHONE ENCOUNTER
Caller: Colleen Walker    Relationship: Self    Best call back number: [phone]     What is the best time to reach you: ANY    Who are you requesting to speak with (clinical staff, provider,  specific staff member): PROVIDER    What was the call regarding: PATIENT TELEPHONED TO ADVISE SHE WILL NOT BE ABLE TO AFFORD TO CONTINUE WITH BOTOX THERAPY WITH HER INSURANCE. SHE WOULD LIKE TO SPEAK TO PROVIDER ABOUT ALTERNATIVE RECOMMENDATIONS FOR THE BOTOX.    PLEASE CALL & ADVISE-THANK YOU

## 2023-12-14 ENCOUNTER — OFFICE VISIT (OUTPATIENT)
Dept: NEUROLOGY | Facility: CLINIC | Age: 28
End: 2023-12-14
Payer: COMMERCIAL

## 2023-12-14 VITALS
WEIGHT: 166 LBS | SYSTOLIC BLOOD PRESSURE: 124 MMHG | HEIGHT: 66 IN | BODY MASS INDEX: 26.68 KG/M2 | DIASTOLIC BLOOD PRESSURE: 76 MMHG | HEART RATE: 99 BPM | OXYGEN SATURATION: 100 %

## 2023-12-14 DIAGNOSIS — G43.719 INTRACTABLE CHRONIC MIGRAINE WITHOUT AURA AND WITHOUT STATUS MIGRAINOSUS: Primary | ICD-10-CM

## 2023-12-14 PROCEDURE — 99213 OFFICE O/P EST LOW 20 MIN: CPT | Performed by: PSYCHIATRY & NEUROLOGY

## 2023-12-14 RX ORDER — FREMANEZUMAB-VFRM 225 MG/1.5ML
225 INJECTION SUBCUTANEOUS ONCE
Qty: 1.68 ML | Refills: 0 | COMMUNITY
Start: 2023-12-14 | End: 2023-12-14

## 2023-12-14 RX ORDER — FREMANEZUMAB-VFRM 225 MG/1.5ML
225 INJECTION SUBCUTANEOUS
Qty: 1.68 ML | Refills: 2 | Status: SHIPPED | OUTPATIENT
Start: 2023-12-14

## 2023-12-14 RX ORDER — FREMANEZUMAB-VFRM 225 MG/1.5ML
225 INJECTION SUBCUTANEOUS
Qty: 1.68 ML | Refills: 0 | COMMUNITY
Start: 2023-12-14 | End: 2023-12-14

## 2023-12-14 RX ORDER — SUMATRIPTAN 100 MG/1
100 TABLET, FILM COATED ORAL ONCE AS NEEDED
Qty: 12 TABLET | Refills: 2 | Status: SHIPPED | OUTPATIENT
Start: 2023-12-14

## 2023-12-14 RX ORDER — FREMANEZUMAB-VFRM 225 MG/1.5ML
225 INJECTION SUBCUTANEOUS
Qty: 1.68 ML | Refills: 2 | Status: SHIPPED | OUTPATIENT
Start: 2023-12-14 | End: 2023-12-14

## 2023-12-14 NOTE — ASSESSMENT & PLAN NOTE
28 year old right handed woman with chronic migraines which had responded well to Botox but unfortunately with high out of pocket cost who would like to have this switched to a more affordable preventative medicine.  She reports a history of migraines starting in 2021.  She would lose vision and then fade back in depending on how long the ocular migraines lasted.  She tells me she started getting Botox injections for migraines which are very helpful but unfortunately the insurance is not covering very much of this medicine so she has a lot of out of pocket cost.  Of note she has been tried on topiramate, trazodone and is currently on lamotrigine and buspirone and propranolol.  She has also tried sumatriptan for acute treatment of her migraines which makes her sleepy but does help with her headaches.  She has had normal brain MRI and MRA in 8/2021.  She reports having her eyes checked by ophthalmology who also felt that she was having migraine auras.  She would like to continue with the Botox for migraines but unfortunately she cannot afford to continue this even though it led to reduction of 80%+ and sumatriptan 100 mg is helping more than the rizatriptan was in the past.  She is only getting 3 migraine days per month leading up to Botox and was supposed to have this last week.  She has an IUD and not planning on getting pregnant anytime soon.  She would like to try a different more affordable preventative medicine and continue the sumatriptan for acute treatment. I will discontinue the Botox and instead start her on Ajovy for migraine prevention and continue the sumatriptan for acute treatment.  Discussed migraine triggers and lifestyle modifications and provided patient education information today.  Advised her not to get pregnant on this medicine as she will need to be off of it for at least 6 months if considering in the future.

## 2023-12-14 NOTE — PROGRESS NOTES
Chief Complaint  Migraine (Would like to discuss preventatives as she is unable to afford botox)    Subjective          Colleen Walker presents to [facility] for   HISTORY OF PRESENT ILLNESS:    Colleen Walker is a 28 year old right handed woman here for follow up evaluation and treatment of migraines which had responded well to Botox.  She reports a history of migraines starting in 2021.  She would lose vision and then fade back in depending on how long the ocular migraines lasted.  She tells me she started getting Botox injections for migraines which are very helpful but unfortunately the insurance is not covering very much of this medicine so she has a lot of out of pocket cost.  Of note she has been tried on topiramate, trazodone and is currently on lamotrigine and buspirone and propranolol.  She has also tried sumatriptan for acute treatment of her migraines which makes her sleepy but does help with her headaches.  She has had normal brain MRI and MRA in 8/2021.  She reports having her eyes checked by ophthalmology who also felt that she was having migraine auras.  She would like to continue with the Botox for migraines but unfortunately she cannot afford to continue this even though it led to reduction of 80%+ and sumatriptan 100 mg is helping more than the rizatriptan was in the past.  She is only getting 3 migraine days per month leading up to Botox and was supposed to have this last week.  She has an IUD and not planning on getting pregnant anytime soon.  She would like to try a different more affordable preventative medicine and continue the sumatriptan for acute treatment.      Past Medical History:   Diagnosis Date    Anxiety     Depression     Head injury 2010    Concussion    Headache, tension-type 2021    Migraine 2021    Aura Migraines        Family History   Problem Relation Age of Onset    Hyperlipidemia Mother         Social History     Socioeconomic History    Marital  "status:    Tobacco Use    Smoking status: Former     Types: Cigarettes, Electronic Cigarette     Quit date: 2022     Years since quittin.2    Smokeless tobacco: Never   Vaping Use    Vaping Use: Former   Substance and Sexual Activity    Alcohol use: Yes     Alcohol/week: 6.0 standard drinks of alcohol     Types: 3 Glasses of wine, 3 Drinks containing 0.5 oz of alcohol per week     Comment: socially    Drug use: Yes     Frequency: 7.0 times per week     Types: Marijuana    Sexual activity: Yes     Partners: Male     Birth control/protection: I.U.D.        I have reviewed and confirmed the accuracy of the ROS as documented by the MA/LPN/RN Nicki Barone MD   Review of Systems   Neurological:  Negative for dizziness, tremors, seizures, syncope, facial asymmetry, speech difficulty, weakness, light-headedness, numbness, headache, memory problem and confusion.   Psychiatric/Behavioral:  Negative for agitation, behavioral problems, decreased concentration, dysphoric mood, hallucinations, self-injury, sleep disturbance, suicidal ideas, negative for hyperactivity, depressed mood and stress. The patient is not nervous/anxious.         Objective   Vital Signs:   /76   Pulse 99   Ht 168.6 cm (66.38\")   Wt 75.3 kg (166 lb)   SpO2 100%   BMI 26.49 kg/m²       PHYSICAL EXAM:    General   Mental Status - Alert. General Appearance - Well developed, Well groomed, Oriented and Cooperative. Orientation - Oriented X3.       Head and Neck  Head - normocephalic, atraumatic with no lesions or palpable masses.  Neck    Global Assessment - supple.       Eye   Sclera/Conjunctiva - Bilateral - Normal.    ENMT  Mouth and Throat   Oral Cavity/Oropharynx: Oropharynx - the soft palate,uvula and tongue are normal in appearance.    Chest and Lung Exam   Chest - lung clear to auscultation bilaterally.    Cardiovascular   Cardiovascular examination reveals  - normal heart sounds, regular rate and rhythm.    Neurologic "   Mental Status: Speech - Normal. Cognitive function - appropriate fund of knowledge. No impairment of attention, Impairment of concentration, impairment of long term memory or impairment of short term memory.  Cranial Nerves:   II Optic: Visual acuity - Left - Normal. Right - Normal. Visual fields - Normal (to confrontation).  III Oculomotor: Pupillary constriction - Left - Normal. Right - Normal.  VII Facial: - Normal Bilaterally.   IX Glossopharyngeal / X Vagus - Normal.  XI Accessory: Trapezius - Bilateral - Normal. Sternocleidomastoid - Bilateral - Normal.  XII Hypoglossal - Bilateral - Normal.  Eye Movements: - Normal Bilaterally.  Sensory:   Light Touch: Intact - Globally.  Motor:   Bulk and Contour: - Normal.  Tone: - Normal.  Tremor: Not present.  Strength: 5/5 normal muscle strength - All Muscles.   General Assessment of Reflexes: - deep tendon reflexes are normal. Coordination - No Impairment of finger-to-nose or Impairment of rapid alternating movements. Gait - Normal.       Result Review :                 Assessment and Plan    Problem List Items Addressed This Visit          Neuro    Intractable chronic migraine without aura - Primary    Current Assessment & Plan     28 year old right handed woman with chronic migraines which had responded well to Botox but unfortunately with high out of pocket cost who would like to have this switched to a more affordable preventative medicine.  She reports a history of migraines starting in 2021.  She would lose vision and then fade back in depending on how long the ocular migraines lasted.  She tells me she started getting Botox injections for migraines which are very helpful but unfortunately the insurance is not covering very much of this medicine so she has a lot of out of pocket cost.  Of note she has been tried on topiramate, trazodone and is currently on lamotrigine and buspirone and propranolol.  She has also tried sumatriptan for acute treatment of her  migraines which makes her sleepy but does help with her headaches.  She has had normal brain MRI and MRA in 8/2021.  She reports having her eyes checked by ophthalmology who also felt that she was having migraine auras.  She would like to continue with the Botox for migraines but unfortunately she cannot afford to continue this even though it led to reduction of 80%+ and sumatriptan 100 mg is helping more than the rizatriptan was in the past.  She is only getting 3 migraine days per month leading up to Botox and was supposed to have this last week.  She has an IUD and not planning on getting pregnant anytime soon.  She would like to try a different more affordable preventative medicine and continue the sumatriptan for acute treatment. I will discontinue the Botox and instead start her on Ajovy for migraine prevention and continue the sumatriptan for acute treatment.  Discussed migraine triggers and lifestyle modifications and provided patient education information today.  Advised her not to get pregnant on this medicine as she will need to be off of it for at least 6 months if considering in the future.               Relevant Medications    Fremanezumab-vfrm (Ajovy) 225 MG/1.5ML solution auto-injector    Fremanezumab-vfrm (Ajovy) 225 MG/1.5ML solution auto-injector    SUMAtriptan (IMITREX) 100 MG tablet       Follow Up   Return in about 3 months (around 3/14/2024).  Patient was given instructions and counseling regarding her condition or for health maintenance advice. Please see specific information pulled into the AVS if appropriate.

## 2023-12-26 ENCOUNTER — SPECIALTY PHARMACY (OUTPATIENT)
Dept: NEUROLOGY | Facility: CLINIC | Age: 28
End: 2023-12-26
Payer: COMMERCIAL

## 2023-12-26 NOTE — PROGRESS NOTES
Specialty Pharmacy Patient Management Program  Neurology Initial Assessment     Colleen Walker is a 28 y.o. female with chronic migraine seen by a Neurology provider and enrolled in the Neurology Patient Management program offered by [facility].  An initial outreach was conducted, including assessment of therapy appropriateness and specialty medication education for fremanezumab (Ajovy). The patient was introduced to services offered by [facility], including: regular assessments, refill coordination, curbside pick-up or mail order delivery options, prior authorization maintenance, and financial assistance programs as applicable. The patient was also provided with contact information for the pharmacy team.     Insurance Coverage & Financial Support  [email or web address]    Relevant Past Medical History and Comorbidities  Relevant medical history and concomitant health conditions were discussed with the patient. The patient's chart has been reviewed for relevant past medical history and comorbid health conditions and updated as necessary.   Past Medical History:   Diagnosis Date    Anxiety     Depression     Head injury     Concussion    Headache, tension-type     Migraine     Aura Migraines     Social History     Socioeconomic History    Marital status:    Tobacco Use    Smoking status: Former     Types: Cigarettes, Electronic Cigarette     Quit date: 2022     Years since quittin.3    Smokeless tobacco: Never   Vaping Use    Vaping Use: Former   Substance and Sexual Activity    Alcohol use: Yes     Alcohol/week: 6.0 standard drinks of alcohol     Types: 3 Glasses of wine, 3 Drinks containing 0.5 oz of alcohol per week     Comment: socially    Drug use: Yes     Frequency: 7.0 times per week     Types: Marijuana    Sexual activity: Yes     Partners: Male     Birth control/protection: I.U.D.     Problem list reviewed by Von Torrez RPH on  12/26/2023 at  1:21 PM      Allergies  Known allergies and reactions were discussed with the patient. The patient's chart has been reviewed for  allergy information and updated as necessary.   Allergies   Allergen Reactions    Sulfa Antibiotics Other (See Comments)     Unknown rxn     Allergies reviewed by Von Torrez RPH on 12/26/2023 at  1:21 PM      Lab Assessment  Labs have been reviewed. No dose adjustments are needed for the specialty medication(s) based on the labs.       Current Medication List  This medication list has been reviewed with the patient and evaluated for any interactions or necessary modifications/recommendations, and updated to include all prescription medications, OTC medications, and supplements the patient is currently taking.  This list reflects what is contained in the patient's profile, which has also been marked as reviewed to communicate to other providers it is the most up to date version of the patient's current medication therapy.     Current Outpatient Medications:     busPIRone (BUSPAR) 5 MG tablet, , Disp: , Rfl:     clonazePAM (KlonoPIN) 0.5 MG tablet, , Disp: , Rfl:     Fremanezumab-vfrm (Ajovy) 225 MG/1.5ML solution auto-injector, Inject 225 mg under the skin into the appropriate area as directed Every 30 (Thirty) Days., Disp: 1.5 mL, Rfl: 2    guanFACINE (TENEX) 2 MG tablet, , Disp: , Rfl:     hydrOXYzine (ATARAX) 25 MG tablet, , Disp: , Rfl:     lamoTRIgine (LaMICtal) 25 MG tablet, Takes once daily, Disp: , Rfl:     Levonorgestrel (KYLEENA IU), , Disp: , Rfl:     propranolol (INDERAL) 10 MG tablet, , Disp: , Rfl:     SUMAtriptan (IMITREX) 100 MG tablet, Take 1 tablet by mouth 1 (One) Time As Needed for Migraine. Take one tablet at onset of headache. May repeat dose one time in 2 hours if headache not relieved., Disp: 12 tablet, Rfl: 2    ziprasidone (GEODON) 60 MG capsule, , Disp: , Rfl:     Medicines reviewed by Von Torrez RPH on 12/26/2023 at  1:21 PM    Drug  Interactions  None identified.       Initial Education Provided for Specialty Medication  The patient has been provided with the following education and any applicable administration techniques (i.e. self-injection) have been demonstrated for the therapies indicated. All questions and concerns have been addressed prior to the patient receiving the medication, and the patient has verbalized understanding of the education and any materials provided.  Additional patient education shall be provided and documented upon request by the patient, provider or payer.      Ajovy (fremanezumab-vfrm) 225 mg subcutaneous every 30 days     Medication Expectations   Why am I taking this medication? You are taking this medication for migraine prophylaxis.   What should I expect while on this medication? You should expect to a decrease in the frequency and severity of your migraines.   How does the medication work? Ajovy is a monoclonal antibody that binds to calcitonin gene-related peptide (CGRP) and blocks its binding to the receptor decreasing the severity of migraines.   How long will I be on this medication for? The amount of time you will be on this medication will be determined by your doctor and your response to the medication.    How do I take this medication? Take as directed on your prescription label. This medication is a self-injection given every 30 days.    What are some possible side effects? Injection site reactions and hypersensitivity reactions.   What happens if I miss a dose? If you miss a dose, take it as soon as you remember, and time next dose 30 days from last dose.       Medication Safety   What are things I should warn my doctor immediately about? Cases of anaphylaxis and angioedema have been reported in the postmarketing setting. If a reaction occurs, notify your doctor immediately.   What are things that I should be cautious of? Injection site reaction and hypersensitivity reactions, including rash,  pruritus, drug hypersensitivity, and urticaria   What are some medications that can interact with this one? No drug interactions identified.      Medication Storage/Handling   How should I handle this medication? Keep this medication our of reach of pets/children in original container.  On the day your Ajovy is due let it set at room temperature for 30 minutes prior to injection. (do NOT warm using a heat source such as hot water or a microwave).  Administer in the abdomen, thigh, back of the upper arm, or buttocks.  Do not inject where the skin is tender, bruised, red or hard.  Rotate injection sites.   How does this medication need to be stored? Store in refrigerator and keep dry.   How should I dispose of this medication? You can dispose of the empty syringe in a sharps container, and if this is not available you may use an empty hard plastic container such as a milk jug or tide container.      Resources/Support   How can I remind myself to take this medication? You can download a reminder nicolás on your phone or use a calandar  to help with your monthly injection.   Is financial support available?  Yes, [employer] can provide co-pay cards if you have commercial insurance or patient assistance if you have Medicare or no insurance.    Which vaccines are recommended for me? Talk to your doctor about these vaccines: Flu, Coronavirus (COVID-19), Pneumococcal (pneumonia), Tdap, Hepatitis B, Zoster (shingles)                  Adherence and Self-Administration  Adherence related to the patient's specialty therapy was discussed with the patient. The Adherence segment of this outreach has been reviewed and updated.   Is there a concern with patient's ability to self administer the medication correctly and without issue?: No  Were any potential barriers to adherence identified during the initial assessment or patient education?: No  Are there any concerns regarding the patient's understanding of the importance of  medication adherence?: No  Methods for Supporting Patient Adherence and/or Self-Administration: None needed at this time.    Goals of Therapy  Goals related to the patient's specialty therapy were discussed with the patient. The Patient Goals segment of this outreach has been reviewed and updated.   Goals Addressed Today        Specialty Pharmacy General Goal      Reduce severity and frequency of migraines. She was experiencing ~10 migraine days per month prior to Botox, getting 3 migraine days per month leading up to Botox. Insurance not covering Botox and too expensive, changing to fremanezumab per Dr. Barone 12/14/23.                  Reassessment Plan & Follow-Up  Medication Therapy Changes: Starting fremanezumab (Ajovy) for migraine prevention per patient's neurologist.  Related Plans, Therapy Recommendations, or Therapy Problems to Be Addressed: Nothing further to be addressed at this time.   Pharmacist to perform regular reassessments no more than (6) months from the previous assessment.  Care Coordinator to set up future refill outreaches, coordinate prescription delivery, and escalate clinical questions to pharmacist.   Welcome information and patient satisfaction survey to be sent by specialty pharmacy team with patient's initial fill.    Attestation  Therapeutic appropriateness: Appropriate   I attest the patient was actively involved in and has agreed to the above plan of care. If the prescribed therapy is at any point deemed not appropriate based on the current or future assessments, a consultation will be initiated with the patient's specialty care provider to determine the best course of action. The revised plan of therapy will be documented along with any additional patient education provided. Discussed aforementioned material with patient by phone.    Von Torrez PharmD, [facility] Specialty Pharmacist, Neurology  12/26/2023  13:25 EST

## 2024-03-15 ENCOUNTER — OFFICE VISIT (OUTPATIENT)
Dept: NEUROLOGY | Facility: CLINIC | Age: 29
End: 2024-03-15
Payer: COMMERCIAL

## 2024-03-15 VITALS
WEIGHT: 170 LBS | SYSTOLIC BLOOD PRESSURE: 110 MMHG | HEIGHT: 66 IN | OXYGEN SATURATION: 98 % | HEART RATE: 102 BPM | BODY MASS INDEX: 27.32 KG/M2 | DIASTOLIC BLOOD PRESSURE: 70 MMHG

## 2024-03-15 DIAGNOSIS — G43.719 INTRACTABLE CHRONIC MIGRAINE WITHOUT AURA AND WITHOUT STATUS MIGRAINOSUS: ICD-10-CM

## 2024-03-15 DIAGNOSIS — G43.009 MIGRAINE WITHOUT AURA AND WITHOUT STATUS MIGRAINOSUS, NOT INTRACTABLE: Primary | ICD-10-CM

## 2024-03-15 PROCEDURE — 99213 OFFICE O/P EST LOW 20 MIN: CPT | Performed by: PSYCHIATRY & NEUROLOGY

## 2024-03-15 RX ORDER — FREMANEZUMAB-VFRM 225 MG/1.5ML
225 INJECTION SUBCUTANEOUS
Qty: 4.5 ML | Refills: 0 | Status: SHIPPED | OUTPATIENT
Start: 2024-03-15

## 2024-03-15 RX ORDER — SUMATRIPTAN 100 MG/1
100 TABLET, FILM COATED ORAL ONCE AS NEEDED
Qty: 12 TABLET | Refills: 2 | Status: SHIPPED | OUTPATIENT
Start: 2024-03-15

## 2024-03-15 NOTE — ASSESSMENT & PLAN NOTE
29 year old right handed woman with migraines which had responded well to Botox but unfortunately insurance out of pocket cost was too high so we had to switch to Ajovy.  She reports a history of migraines starting in 2021.  She would lose vision and then fade back in depending on how long the ocular migraines lasted.  She tells me she started getting Botox injections for migraines which are very helpful but unfortunately the insurance is not covering very much of this medicine so she has a lot of out of pocket cost.  Of note she has been tried on topiramate, trazodone and is currently on lamotrigine and buspirone and propranolol.  She has also tried sumatriptan for acute treatment of her migraines which makes her sleepy but does help with her headaches.  She has had normal brain MRI and MRA in 8/2021.  She reports having her eyes checked by ophthalmology who also felt that she was having migraine auras.  She would like to continue with the Botox for migraines but unfortunately she cannot afford to continue this even though it led to reduction of 80%+ and sumatriptan 100 mg is helping more than the rizatriptan was in the past.  She is only getting 3 migraine days per month leading up to Botox and since starting the Ajovy she is doing well and only gets breakthrough migraines 1-2 days leading up to her next set of injections which are responding to the sumatriptan as needed.  She has an IUD and not planning on getting pregnant anytime soon.  I will continue the Ajovy for prevention and sumatriptan for acute treatment.  Again advised her not to get pregnant on these medicines.  Discussed migraine triggers and lifestyle modifications and provided patient education information today.

## 2024-03-15 NOTE — LETTER
March 15, 2024       No Recipients    Patient: Colleen Walker   YOB: 1995   Date of Visit: 3/15/2024     Dear Julianna Pollock MD:       Thank you for referring Colleen Walker to me for evaluation. Below are the relevant portions of my assessment and plan of care.    If you have questions, please do not hesitate to call me. I look forward to following Colleen along with you.         Sincerely,        Nicki Barone MD        CC:   No Recipients    Nicki Barone MD  03/15/24 0927  Sign when Signing Visit  Chief Complaint  Migraine    Subjective         Colleen Walker presents to [facility]UROLOG[facility] for   HISTORY OF PRESENT ILLNESS:    Colleen Walker is a 29 year old right handed woman here for follow up evaluation and treatment of migraines which had responded well to Botox but unfortunately insurance out of pocket cost was too high so we had to switch to Ajovy.  She reports a history of migraines starting in 2021.  She would lose vision and then fade back in depending on how long the ocular migraines lasted.  She tells me she started getting Botox injections for migraines which are very helpful but unfortunately the insurance is not covering very much of this medicine so she has a lot of out of pocket cost.  Of note she has been tried on topiramate, trazodone and is currently on lamotrigine and buspirone and propranolol.  She has also tried sumatriptan for acute treatment of her migraines which makes her sleepy but does help with her headaches.  She has had normal brain MRI and MRA in 8/2021.  She reports having her eyes checked by ophthalmology who also felt that she was having migraine auras.  She would like to continue with the Botox for migraines but unfortunately she cannot afford to continue this even though it led to reduction of 80%+ and sumatriptan 100 mg is helping more than the rizatriptan was in the past.  She is only getting 3 migraine days per month leading up to Botox  "and since starting the Ajovy she is doing well and only gets breakthrough migraines 1-2 days leading up to her next set of injections which are responding to the sumatriptan as needed.  She has an IUD and not planning on getting pregnant anytime soon.       Past Medical History:   Diagnosis Date   • Anxiety    • Depression    • Head injury     Concussion   • Headache, tension-type    • Migraine     Aura Migraines        Family History   Problem Relation Age of Onset   • Hyperlipidemia Mother         Social History     Socioeconomic History   • Marital status:    Tobacco Use   • Smoking status: Former     Current packs/day: 0.00     Types: Cigarettes, Electronic Cigarette     Quit date: 2022     Years since quittin.5   • Smokeless tobacco: Never   Vaping Use   • Vaping status: Former   Substance and Sexual Activity   • Alcohol use: Yes     Alcohol/week: 6.0 standard drinks of alcohol     Types: 3 Glasses of wine, 3 Drinks containing 0.5 oz of alcohol per week     Comment: socially   • Drug use: Yes     Frequency: 7.0 times per week     Types: Marijuana   • Sexual activity: Yes     Partners: Male     Birth control/protection: I.U.D.        I have reviewed and confirmed the accuracy of the ROS as documented by the MA/LPN/RN Nicki Barone MD   Review of Systems   Eyes:  Positive for photophobia and visual disturbance.   Gastrointestinal:  Negative for nausea and vomiting.   Neurological:  Positive for headache. Negative for dizziness, tremors, seizures, syncope, facial asymmetry, speech difficulty, weakness, light-headedness, numbness, memory problem and confusion.        Objective  Vital Signs:   /70   Pulse 102   Ht 168.6 cm (66.38\")   Wt 77.1 kg (170 lb)   SpO2 98%   BMI 27.13 kg/m²       PHYSICAL EXAM:    General   Mental Status - Alert. General Appearance - Well developed, Well groomed, Oriented and Cooperative. Orientation - Oriented X3.       Head and Neck  Head - " normocephalic, atraumatic with no lesions or palpable masses.  Neck    Global Assessment - supple.       Eye   Sclera/Conjunctiva - Bilateral - Normal.    ENMT  Mouth and Throat   Oral Cavity/Oropharynx: Oropharynx - the soft palate,uvula and tongue are normal in appearance.    Chest and Lung Exam   Chest - lung clear to auscultation bilaterally.    Cardiovascular   Cardiovascular examination reveals  - normal heart sounds, regular rate and rhythm.    Neurologic   Mental Status: Speech - Normal. Cognitive function - appropriate fund of knowledge. No impairment of attention, Impairment of concentration, impairment of long term memory or impairment of short term memory.  Cranial Nerves:   II Optic: Visual acuity - Left - Normal. Right - Normal. Visual fields - Normal (to confrontation).  III Oculomotor: Pupillary constriction - Left - Normal. Right - Normal.  VII Facial: - Normal Bilaterally.   IX Glossopharyngeal / X Vagus - Normal.  XI Accessory: Trapezius - Bilateral - Normal. Sternocleidomastoid - Bilateral - Normal.  XII Hypoglossal - Bilateral - Normal.  Eye Movements: - Normal Bilaterally.  Sensory:   Light Touch: Intact - Globally.  Motor:   Bulk and Contour: - Normal.  Tone: - Normal.  Tremor: Not present.  Strength: 5/5 normal muscle strength - All Muscles.   General Assessment of Reflexes: - deep tendon reflexes are normal. Coordination - No Impairment of finger-to-nose or Impairment of rapid alternating movements. Gait - Normal.       Result Review:                 Assessment and Plan    Problem List Items Addressed This Visit          Neuro    Migraine without aura and without status migrainosus, not intractable - Primary    Current Assessment & Plan     29 year old right handed woman with migraines which had responded well to Botox but unfortunately insurance out of pocket cost was too high so we had to switch to Ajovy.  She reports a history of migraines starting in 2021.  She would lose vision and then  fade back in depending on how long the ocular migraines lasted.  She tells me she started getting Botox injections for migraines which are very helpful but unfortunately the insurance is not covering very much of this medicine so she has a lot of out of pocket cost.  Of note she has been tried on topiramate, trazodone and is currently on lamotrigine and buspirone and propranolol.  She has also tried sumatriptan for acute treatment of her migraines which makes her sleepy but does help with her headaches.  She has had normal brain MRI and MRA in 8/2021.  She reports having her eyes checked by ophthalmology who also felt that she was having migraine auras.  She would like to continue with the Botox for migraines but unfortunately she cannot afford to continue this even though it led to reduction of 80%+ and sumatriptan 100 mg is helping more than the rizatriptan was in the past.  She is only getting 3 migraine days per month leading up to Botox and since starting the Ajovy she is doing well and only gets breakthrough migraines 1-2 days leading up to her next set of injections which are responding to the sumatriptan as needed.  She has an IUD and not planning on getting pregnant anytime soon.  I will continue the Ajovy for prevention and sumatriptan for acute treatment.  Again advised her not to get pregnant on these medicines.  Discussed migraine triggers and lifestyle modifications and provided patient education information today.            Relevant Medications    Fremanezumab-vfrm (Ajovy) 225 MG/1.5ML solution auto-injector    SUMAtriptan (IMITREX) 100 MG tablet     Other Visit Diagnoses       Intractable chronic migraine without aura and without status migrainosus        Relevant Medications    Fremanezumab-vfrm (Ajovy) 225 MG/1.5ML solution auto-injector    SUMAtriptan (IMITREX) 100 MG tablet            Follow Up   Return in about 3 months (around 6/15/2024).  Patient was given instructions and counseling  regarding her condition or for health maintenance advice. Please see specific information pulled into the AVS if appropriate.

## 2024-03-15 NOTE — PROGRESS NOTES
Chief Complaint  Migraine    Subjective          Colleen Walker presents to [facility] for   HISTORY OF PRESENT ILLNESS:    Colleen Walker is a 29 year old right handed woman here for follow up evaluation and treatment of migraines which had responded well to Botox but unfortunately insurance out of pocket cost was too high so we had to switch to Ajovy.  She reports a history of migraines starting in 2021.  She would lose vision and then fade back in depending on how long the ocular migraines lasted.  She tells me she started getting Botox injections for migraines which are very helpful but unfortunately the insurance is not covering very much of this medicine so she has a lot of out of pocket cost.  Of note she has been tried on topiramate, trazodone and is currently on lamotrigine and buspirone and propranolol.  She has also tried sumatriptan for acute treatment of her migraines which makes her sleepy but does help with her headaches.  She has had normal brain MRI and MRA in 8/2021.  She reports having her eyes checked by ophthalmology who also felt that she was having migraine auras.  She would like to continue with the Botox for migraines but unfortunately she cannot afford to continue this even though it led to reduction of 80%+ and sumatriptan 100 mg is helping more than the rizatriptan was in the past.  She is only getting 3 migraine days per month leading up to Botox and since starting the Ajovy she is doing well and only gets breakthrough migraines 1-2 days leading up to her next set of injections which are responding to the sumatriptan as needed.  She has an IUD and not planning on getting pregnant anytime soon.       Past Medical History:   Diagnosis Date    Anxiety     Depression     Head injury 2010    Concussion    Headache, tension-type 2021    Migraine 2021    Aura Migraines        Family History   Problem Relation Age of Onset    Hyperlipidemia Mother         Social History  "    Socioeconomic History    Marital status:    Tobacco Use    Smoking status: Former     Current packs/day: 0.00     Types: Cigarettes, Electronic Cigarette     Quit date: 2022     Years since quittin.5    Smokeless tobacco: Never   Vaping Use    Vaping status: Former   Substance and Sexual Activity    Alcohol use: Yes     Alcohol/week: 6.0 standard drinks of alcohol     Types: 3 Glasses of wine, 3 Drinks containing 0.5 oz of alcohol per week     Comment: socially    Drug use: Yes     Frequency: 7.0 times per week     Types: Marijuana    Sexual activity: Yes     Partners: Male     Birth control/protection: I.U.D.        I have reviewed and confirmed the accuracy of the ROS as documented by the MA/LPN/RN Nicki Barone MD   Review of Systems   Eyes:  Positive for photophobia and visual disturbance.   Gastrointestinal:  Negative for nausea and vomiting.   Neurological:  Positive for headache. Negative for dizziness, tremors, seizures, syncope, facial asymmetry, speech difficulty, weakness, light-headedness, numbness, memory problem and confusion.        Objective   Vital Signs:   /70   Pulse 102   Ht 168.6 cm (66.38\")   Wt 77.1 kg (170 lb)   SpO2 98%   BMI 27.13 kg/m²       PHYSICAL EXAM:    General   Mental Status - Alert. General Appearance - Well developed, Well groomed, Oriented and Cooperative. Orientation - Oriented X3.       Head and Neck  Head - normocephalic, atraumatic with no lesions or palpable masses.  Neck    Global Assessment - supple.       Eye   Sclera/Conjunctiva - Bilateral - Normal.    ENMT  Mouth and Throat   Oral Cavity/Oropharynx: Oropharynx - the soft palate,uvula and tongue are normal in appearance.    Chest and Lung Exam   Chest - lung clear to auscultation bilaterally.    Cardiovascular   Cardiovascular examination reveals  - normal heart sounds, regular rate and rhythm.    Neurologic   Mental Status: Speech - Normal. Cognitive function - appropriate fund of " knowledge. No impairment of attention, Impairment of concentration, impairment of long term memory or impairment of short term memory.  Cranial Nerves:   II Optic: Visual acuity - Left - Normal. Right - Normal. Visual fields - Normal (to confrontation).  III Oculomotor: Pupillary constriction - Left - Normal. Right - Normal.  VII Facial: - Normal Bilaterally.   IX Glossopharyngeal / X Vagus - Normal.  XI Accessory: Trapezius - Bilateral - Normal. Sternocleidomastoid - Bilateral - Normal.  XII Hypoglossal - Bilateral - Normal.  Eye Movements: - Normal Bilaterally.  Sensory:   Light Touch: Intact - Globally.  Motor:   Bulk and Contour: - Normal.  Tone: - Normal.  Tremor: Not present.  Strength: 5/5 normal muscle strength - All Muscles.   General Assessment of Reflexes: - deep tendon reflexes are normal. Coordination - No Impairment of finger-to-nose or Impairment of rapid alternating movements. Gait - Normal.       Result Review :                 Assessment and Plan    Problem List Items Addressed This Visit          Neuro    Migraine without aura and without status migrainosus, not intractable - Primary    Current Assessment & Plan     29 year old right handed woman with migraines which had responded well to Botox but unfortunately insurance out of pocket cost was too high so we had to switch to Ajovy.  She reports a history of migraines starting in 2021.  She would lose vision and then fade back in depending on how long the ocular migraines lasted.  She tells me she started getting Botox injections for migraines which are very helpful but unfortunately the insurance is not covering very much of this medicine so she has a lot of out of pocket cost.  Of note she has been tried on topiramate, trazodone and is currently on lamotrigine and buspirone and propranolol.  She has also tried sumatriptan for acute treatment of her migraines which makes her sleepy but does help with her headaches.  She has had normal brain MRI  and MRA in 8/2021.  She reports having her eyes checked by ophthalmology who also felt that she was having migraine auras.  She would like to continue with the Botox for migraines but unfortunately she cannot afford to continue this even though it led to reduction of 80%+ and sumatriptan 100 mg is helping more than the rizatriptan was in the past.  She is only getting 3 migraine days per month leading up to Botox and since starting the Ajovy she is doing well and only gets breakthrough migraines 1-2 days leading up to her next set of injections which are responding to the sumatriptan as needed.  She has an IUD and not planning on getting pregnant anytime soon.  I will continue the Ajovy for prevention and sumatriptan for acute treatment.  Again advised her not to get pregnant on these medicines.  Discussed migraine triggers and lifestyle modifications and provided patient education information today.            Relevant Medications    Fremanezumab-vfrm (Ajovy) 225 MG/1.5ML solution auto-injector    SUMAtriptan (IMITREX) 100 MG tablet     Other Visit Diagnoses       Intractable chronic migraine without aura and without status migrainosus        Relevant Medications    Fremanezumab-vfrm (Ajovy) 225 MG/1.5ML solution auto-injector    SUMAtriptan (IMITREX) 100 MG tablet            Follow Up   Return in about 3 months (around 6/15/2024).  Patient was given instructions and counseling regarding her condition or for health maintenance advice. Please see specific information pulled into the AVS if appropriate.

## 2024-03-29 ENCOUNTER — SPECIALTY PHARMACY (OUTPATIENT)
Dept: NEUROLOGY | Facility: CLINIC | Age: 29
End: 2024-03-29
Payer: COMMERCIAL

## 2024-03-29 NOTE — PROGRESS NOTES
Specialty Pharmacy Refill Coordination Note     Colleen is a 29 y.o. female contacted today regarding refills of  Ajovy specialty medication(s).    Reviewed and verified with patient:       Specialty medication(s) and dose(s) confirmed: yes    Refill Questions      Flowsheet Row Most Recent Value   Changes to allergies? No   Changes to medications? No   New conditions or infections since last clinic visit No   Unplanned office visit, urgent care, ED, or hospital admission in the last 4 weeks  No   How does patient/caregiver feel medication is working? Very good   Financial problems or insurance changes  No   Since the previous refill, were any specialty medication doses or scheduled injections missed or delayed?  No   Does this patient require a clinical escalation to a pharmacist? No            Delivery Questions      Flowsheet Row Most Recent Value   Delivery method Beeline   Delivery address verified with patient/caregiver? Yes   Delivery address Home   Number of medications in delivery 1   Medication(s) being filled and delivered Fremanezumab-Vfrm   Copay verified? Yes   Copay amount $15   Copay form of payment Credit/debit on file                   Follow-up: 21 day(s)     Jammie Hinkle, Pharmacy Technician  Specialty Pharmacy Technician

## 2024-06-13 ENCOUNTER — OFFICE VISIT (OUTPATIENT)
Dept: NEUROLOGY | Facility: CLINIC | Age: 29
End: 2024-06-13
Payer: COMMERCIAL

## 2024-06-13 VITALS — HEART RATE: 130 BPM | OXYGEN SATURATION: 98 % | BODY MASS INDEX: 27 KG/M2 | HEIGHT: 66 IN | WEIGHT: 168 LBS

## 2024-06-13 DIAGNOSIS — G43.009 MIGRAINE WITHOUT AURA AND WITHOUT STATUS MIGRAINOSUS, NOT INTRACTABLE: Primary | ICD-10-CM

## 2024-06-13 PROCEDURE — 99213 OFFICE O/P EST LOW 20 MIN: CPT | Performed by: PSYCHIATRY & NEUROLOGY

## 2024-06-13 RX ORDER — FREMANEZUMAB-VFRM 225 MG/1.5ML
225 INJECTION SUBCUTANEOUS
Qty: 4.5 ML | Refills: 1 | Status: SHIPPED | OUTPATIENT
Start: 2024-06-13

## 2024-06-13 RX ORDER — FREMANEZUMAB-VFRM 225 MG/1.5ML
225 INJECTION SUBCUTANEOUS
Qty: 4.5 ML | Refills: 0 | Status: SHIPPED | OUTPATIENT
Start: 2024-06-13 | End: 2024-06-13

## 2024-06-13 NOTE — ASSESSMENT & PLAN NOTE
29 year old right handed woman with migraines which had responded well to Botox but unfortunately insurance out of pocket cost was too high so we had to switch to Ajovy which is helping significantly.  She reports a history of migraines starting in 2021.  She would lose vision and then fade back in depending on how long the ocular migraines lasted.  She tells me she started getting Botox injections for migraines which are very helpful but unfortunately the insurance is not covering very much of this medicine so she has a lot of out of pocket cost.  Of note she has been tried on topiramate, trazodone and is currently on lamotrigine and buspirone and propranolol.  She has also tried sumatriptan for acute treatment of her migraines which makes her sleepy but does help with her headaches.  She has had normal brain MRI and MRA in 8/2021.  She reports having her eyes checked by ophthalmology who also felt that she was having migraine auras.  She would like to continue with the Botox for migraines but unfortunately she cannot afford to continue this even though it led to reduction of 80%+ and sumatriptan 100 mg is helping more than the rizatriptan was in the past.  She is only getting 3 migraine days per month leading up to Botox and since starting the Ajovy she is doing well and only gets breakthrough migraines 1-2 days leading up to her next set of injections which are responding to the sumatriptan as needed.  She has an IUD and not planning on getting pregnant anytime soon.  I will continue the current combination of Ajovy for prevention and sumatriptan for acute treatment.  Again advised her not to get pregnant on this medicine and that she has to be off of it for at least 6 months prior to trying to get pregnant.  Discussed migraine triggers and lifestyle modifications.         Office Visit Chart Prep  Chino RAGLAND Paige is scheduled to see Jolie Vega MD on 3/12/2019.    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            Does this encounter need to be followed up on? no

## 2024-06-13 NOTE — PROGRESS NOTES
Chief Complaint  Migraine (Ajovy- sumatriptian  good combo )    Subjective          Colleen Walker presents to [facility] for   HISTORY OF PRESENT ILLNESS:    Colleen Walker is a 29 year old right handed woman here for follow up evaluation and treatment of migraines which had responded well to Botox but unfortunately insurance out of pocket cost was too high so we had to switch to Ajovy.  She reports a history of migraines starting in 2021.  She would lose vision and then fade back in depending on how long the ocular migraines lasted.  She tells me she started getting Botox injections for migraines which are very helpful but unfortunately the insurance is not covering very much of this medicine so she has a lot of out of pocket cost.  Of note she has been tried on topiramate, trazodone and is currently on lamotrigine and buspirone and propranolol.  She has also tried sumatriptan for acute treatment of her migraines which makes her sleepy but does help with her headaches.  She has had normal brain MRI and MRA in 8/2021.  She reports having her eyes checked by ophthalmology who also felt that she was having migraine auras.  She would like to continue with the Botox for migraines but unfortunately she cannot afford to continue this even though it led to reduction of 80%+ and sumatriptan 100 mg is helping more than the rizatriptan was in the past.  She is only getting 3 migraine days per month leading up to Botox and since starting the Ajovy she is doing well and only gets breakthrough migraines 1-2 days leading up to her next set of injections which are responding to the sumatriptan as needed.  She has an IUD and not planning on getting pregnant anytime soon.       Past Medical History:   Diagnosis Date    Anxiety     Depression     Head injury 2010    Concussion    Headache, tension-type 2021    Migraine 2021    Aura Migraines        Family History   Problem Relation Age of Onset     "Hyperlipidemia Mother         Social History     Socioeconomic History    Marital status:    Tobacco Use    Smoking status: Former     Current packs/day: 0.00     Types: Cigarettes, Electronic Cigarette     Quit date: 2022     Years since quittin.7    Smokeless tobacco: Never   Vaping Use    Vaping status: Former   Substance and Sexual Activity    Alcohol use: Yes     Alcohol/week: 6.0 standard drinks of alcohol     Types: 3 Glasses of wine, 3 Drinks containing 0.5 oz of alcohol per week     Comment: socially    Drug use: Yes     Frequency: 7.0 times per week     Types: Marijuana    Sexual activity: Yes     Partners: Male     Birth control/protection: I.U.D.        I have reviewed and confirmed the accuracy of the ROS as documented by the MA/LPN/RN Nicki Barone MD   Review of Systems   Neurological:  Negative for dizziness, tremors, seizures, syncope, facial asymmetry, speech difficulty, weakness, light-headedness, numbness, headache, memory problem and confusion.   Psychiatric/Behavioral:  Negative for agitation, behavioral problems, decreased concentration, dysphoric mood, hallucinations, self-injury, sleep disturbance, suicidal ideas, negative for hyperactivity, depressed mood and stress. The patient is not nervous/anxious.         Objective   Vital Signs:   Pulse (!) 130   Ht 168.6 cm (66.38\")   Wt 76.2 kg (168 lb)   SpO2 98%   BMI 26.81 kg/m²     126/88    PHYSICAL EXAM:    General   Mental Status - Alert. General Appearance - Well developed, Well groomed, Oriented and Cooperative. Orientation - Oriented X3.       Head and Neck  Head - normocephalic, atraumatic with no lesions or palpable masses.  Neck    Global Assessment - supple.       Eye   Sclera/Conjunctiva - Bilateral - Normal.    ENMT  Mouth and Throat   Oral Cavity/Oropharynx: Oropharynx - the soft palate,uvula and tongue are normal in appearance.    Chest and Lung Exam   Chest - lung clear to auscultation " bilaterally.    Cardiovascular   Cardiovascular examination reveals  - normal heart sounds, regular rate and rhythm.    Neurologic   Mental Status: Speech - Normal. Cognitive function - appropriate fund of knowledge. No impairment of attention, Impairment of concentration, impairment of long term memory or impairment of short term memory.  Cranial Nerves:   II Optic: Visual acuity - Left - Normal. Right - Normal. Visual fields - Normal (to confrontation).  III Oculomotor: Pupillary constriction - Left - Normal. Right - Normal.  VII Facial: - Normal Bilaterally.   IX Glossopharyngeal / X Vagus - Normal.  XI Accessory: Trapezius - Bilateral - Normal. Sternocleidomastoid - Bilateral - Normal.  XII Hypoglossal - Bilateral - Normal.  Eye Movements: - Normal Bilaterally.  Sensory:   Light Touch: Intact - Globally.  Motor:   Bulk and Contour: - Normal.  Tone: - Normal.  Tremor: Not present.  Strength: 5/5 normal muscle strength - All Muscles.   General Assessment of Reflexes: - deep tendon reflexes are normal. Coordination - No Impairment of finger-to-nose or Impairment of rapid alternating movements. Gait - Normal.       Result Review :                 Assessment and Plan    Problem List Items Addressed This Visit          Neuro    Migraine without aura and without status migrainosus, not intractable - Primary    Current Assessment & Plan     29 year old right handed woman with migraines which had responded well to Botox but unfortunately insurance out of pocket cost was too high so we had to switch to Ajovy which is helping significantly.  She reports a history of migraines starting in 2021.  She would lose vision and then fade back in depending on how long the ocular migraines lasted.  She tells me she started getting Botox injections for migraines which are very helpful but unfortunately the insurance is not covering very much of this medicine so she has a lot of out of pocket cost.  Of note she has been tried on  topiramate, trazodone and is currently on lamotrigine and buspirone and propranolol.  She has also tried sumatriptan for acute treatment of her migraines which makes her sleepy but does help with her headaches.  She has had normal brain MRI and MRA in 8/2021.  She reports having her eyes checked by ophthalmology who also felt that she was having migraine auras.  She would like to continue with the Botox for migraines but unfortunately she cannot afford to continue this even though it led to reduction of 80%+ and sumatriptan 100 mg is helping more than the rizatriptan was in the past.  She is only getting 3 migraine days per month leading up to Botox and since starting the Ajovy she is doing well and only gets breakthrough migraines 1-2 days leading up to her next set of injections which are responding to the sumatriptan as needed.  She has an IUD and not planning on getting pregnant anytime soon.  I will continue the current combination of Ajovy for prevention and sumatriptan for acute treatment.  Again advised her not to get pregnant on this medicine and that she has to be off of it for at least 6 months prior to trying to get pregnant.  Discussed migraine triggers and lifestyle modifications.               Relevant Medications    Fremanezumab-vfrm (Ajovy) 225 MG/1.5ML solution auto-injector       Follow Up   Return in about 6 months (around 12/13/2024).  Patient was given instructions and counseling regarding her condition or for health maintenance advice. Please see specific information pulled into the AVS if appropriate.

## 2024-06-14 ENCOUNTER — SPECIALTY PHARMACY (OUTPATIENT)
Dept: INFUSION THERAPY | Facility: HOSPITAL | Age: 29
End: 2024-06-14
Payer: COMMERCIAL

## 2024-06-14 NOTE — PROGRESS NOTES
Specialty Pharmacy Patient Management Program  Neurology Reassessment     Colleen Walker is a 29 y.o. female with migraines seen by a Neurology provider and enrolled in the Neurology Patient Management program offered by [facility].  A follow-up outreach was conducted, including assessment of continued therapy appropriateness, medication adherence, and side effect incidence and management for Ajovy 225mg/1.5mL.     Changes to Insurance Coverage or Financial Support  [employer]      Relevant Past Medical History and Comorbidities  Relevant medical history and concomitant health conditions were discussed with the patient. The patient's chart has been reviewed for relevant past medical history and comorbid health conditions and updated as necessary.   Past Medical History:   Diagnosis Date    Anxiety     Depression     Head injury     Concussion    Headache, tension-type     Migraine     Aura Migraines     Social History     Socioeconomic History    Marital status:    Tobacco Use    Smoking status: Former     Current packs/day: 0.00     Types: Cigarettes, Electronic Cigarette     Quit date: 2022     Years since quittin.7    Smokeless tobacco: Never   Vaping Use    Vaping status: Former   Substance and Sexual Activity    Alcohol use: Yes     Alcohol/week: 6.0 standard drinks of alcohol     Types: 3 Glasses of wine, 3 Drinks containing 0.5 oz of alcohol per week     Comment: socially    Drug use: Yes     Frequency: 7.0 times per week     Types: Marijuana    Sexual activity: Yes     Partners: Male     Birth control/protection: I.U.D.     Problem list reviewed by Edilson Ballard [facility] on 2024 at 10:59 AM    Hospitalizations and Urgent Care Since Last Assessment  ED Visits, Admissions, or Hospitalizations: Per the patient, there have been no recent ED visits or hospitalizations.    Urgent Office Visits: n/a     Allergies  Known allergies and reactions  were discussed with the patient. The patient's chart has been reviewed for allergy information and updated as necessary.   Allergies   Allergen Reactions    Sulfa Antibiotics Other (See Comments)     Unknown rxn     Allergies reviewed by Edilson Ballard RP on 6/14/2024 at 10:58 AM    Relevant Laboratory Values      Lab Assessment  There are no recent or up to date laboratory values in Epic to assess. There are no required ongoing laboratory monitoring parameters for this drug. The patient has been encouraged to obtain a baseline set of labs for the 2024 calendar year for their own health records.     Current Medication List  This medication list has been reviewed with the patient and evaluated for any interactions or necessary modifications/recommendations, and updated to include all prescription medications, OTC medications, and supplements the patient is currently taking.  This list reflects what is contained in the patient's profile, which has also been marked as reviewed to communicate to other providers it is the most up to date version of the patient's current medication therapy.     Current Outpatient Medications:     busPIRone (BUSPAR) 5 MG tablet, , Disp: , Rfl:     clonazePAM (KlonoPIN) 0.5 MG tablet, , Disp: , Rfl:     Fremanezumab-vfrm (Ajovy) 225 MG/1.5ML solution auto-injector, Inject 225 mg under the skin into the appropriate area as directed Every 30 (Thirty) Days., Disp: 4.5 mL, Rfl: 1    guanFACINE (TENEX) 2 MG tablet, , Disp: , Rfl:     hydrOXYzine (ATARAX) 25 MG tablet, , Disp: , Rfl:     lamoTRIgine (LaMICtal) 25 MG tablet, Takes once daily, Disp: , Rfl:     Levonorgestrel (KYLEENA IU), , Disp: , Rfl:     propranolol (INDERAL) 10 MG tablet, , Disp: , Rfl:     SUMAtriptan (IMITREX) 100 MG tablet, Take 1 tablet by mouth 1 (One) Time As Needed for Migraine. Take one tablet at onset of headache. May repeat dose one time in 2 hours if headache not relieved., Disp: 12 tablet, Rfl: 2     ziprasidone (GEODON) 60 MG capsule, , Disp: , Rfl:     Medicines reviewed by Edilson Ballard [facility] on 6/14/2024 at 10:59 AM    Drug Interactions  The patient's medication profile has been reviewed for accuracy and assessed for interactions. There are currently no significant interactions to address at this time, however, the patient has been encouraged to notify our office if they plan to start any new medications or supplements.      Adverse Drug Reactions  Medication tolerability: Tolerating with no to minimal ADRs          Medication plan: Continue therapy with normal follow-up  Plan for ADR Management: Per the patient, there are no reportable side effects associated with Ajovy.       Adherence, Self-Administration, and Current Therapy Problems  Adherence related patient's specialty therapy was discussed with the patient. The Adherence segment of this outreach has been reviewed and updated.   Adherence Questions  Linked Medication(s) Assessed: Fremanezumab-Vfrm  On average, how many doses/injections does the patient miss per month?: 0  What are the identified reasons for non-adherence or missed doses? : no problems identified  What is the estimated medication adherence level?: %  Based on the patient/caregiver response and refill history, does this patient require an MTP to track adherence improvements?: no    Additional Barriers to Patient Self-Administration: There are no barriers to adherence. The patient is scheduled to inject her next dose today.   Methods for Supporting Patient Self-Administration: n/a - the patient has been receiving three month supplies.     Recently Close Medication Therapy Problems  No medication therapy recommendations to display  Open Medication Therapy Problems  No medication therapy recommendations to display     Goals of Therapy  Goals related to the patient's specialty therapy was discussed with the patient. The Patient Goals segment of this outreach has been  reviewed and updated.    Goals Addressed Today        Specialty Pharmacy General Goal      Reduce severity and frequency of migraines. She was experiencing ~10 migraine days per month prior to Botox, getting 3 migraine days per month leading up to Botox. Insurance not covering Botox and too expensive, changing to fremanezumab per Dr. Barone 12/14/23.   6/14 - Per the patient, she is maintaining her status quo and is still experiencing 3 to 4 migraine days per month. Her migraines are also less severe and more responsive to treatment. She is very pleased with her outcomes and would like to maintain course.                Quality of Life Assessment   Quality of Life related to the patient's enrollment in the patient management program and services provided was discussed with the patient. The QOL segment of this outreach has been reviewed and updated.   Quality of Life Improvement Scale: 9-A good deal better    Reassessment Plan & Follow-Up  Medication Therapy Changes: There are no medication therapy changes to recommend at this time. She is continuing to maintain her status quo with Ajovy and has cut migraines by greater than 50%.   Related Plans, Therapy Recommendations, or Issues to Be Addressed: Will continue to play an important role in the patient's adherence to preventative therapy. Will address abortive therapy in the future if migraines become more severe.  Pharmacist to perform regular reassessments no more than (6) months from the previous assessment.  Care Coordinator to set up future refill outreaches, coordinate prescription delivery, and escalate clinical questions to pharmacist.     Attestation  Therapeutic appropriateness: Appropriate  I attest the patient was actively involved in and has agreed to the above plan of care. If the prescribed therapy is at any point deemed not appropriate based on the current or future assessments, a consultation will be initiated with the patient's specialty care  provider to determine the best course of action. The revised plan of therapy will be documented along with any additional patient education provided. Discussed aforementioned material with patient by phone.    Lonny Ballard, PharmD  Clinic Specialty Pharmacist, Neurology  6/14/2024  10:59 EDT

## 2024-06-25 ENCOUNTER — SPECIALTY PHARMACY (OUTPATIENT)
Dept: NEUROLOGY | Facility: CLINIC | Age: 29
End: 2024-06-25
Payer: COMMERCIAL

## 2024-06-25 NOTE — PROGRESS NOTES
Specialty Pharmacy Refill Coordination Note     Colleen is a 29 y.o. female contacted today regarding refills of  Ajovy specialty medication(s).    Reviewed and verified with patient:       Specialty medication(s) and dose(s) confirmed: yes    Refill Questions      Flowsheet Row Most Recent Value   Changes to allergies? No   Changes to medications? No   New conditions or infections since last clinic visit No   Unplanned office visit, urgent care, ED, or hospital admission in the last 4 weeks  No   How does patient/caregiver feel medication is working? Very good   Financial problems or insurance changes  No   Since the previous refill, were any specialty medication doses or scheduled injections missed or delayed?  No   Does this patient require a clinical escalation to a pharmacist? No            Delivery Questions      Flowsheet Row Most Recent Value   Delivery method Beeline   Delivery address verified with patient/caregiver? Yes   Delivery address Home   Number of medications in delivery 1   Medication(s) being filled and delivered Fremanezumab-Vfrm   Doses left of specialty medications 1   Copay verified? Yes   Copay amount $15   Copay form of payment Credit/debit on file                   Follow-up: 81 day(s)     Jammie Hinkle, Pharmacy Technician  Specialty Pharmacy Technician

## 2024-09-06 ENCOUNTER — APPOINTMENT (OUTPATIENT)
Dept: CT IMAGING | Facility: HOSPITAL | Age: 29
End: 2024-09-06
Payer: COMMERCIAL

## 2024-09-06 ENCOUNTER — HOSPITAL ENCOUNTER (EMERGENCY)
Facility: HOSPITAL | Age: 29
Discharge: HOME | End: 2024-09-06
Attending: EMERGENCY MEDICINE
Payer: COMMERCIAL

## 2024-09-06 ENCOUNTER — NURSE TRIAGE (OUTPATIENT)
Dept: CALL CENTER | Facility: HOSPITAL | Age: 29
End: 2024-09-06
Payer: OTHER MISCELLANEOUS

## 2024-09-06 VITALS
HEIGHT: 66 IN | DIASTOLIC BLOOD PRESSURE: 64 MMHG | HEART RATE: 74 BPM | BODY MASS INDEX: 24.91 KG/M2 | OXYGEN SATURATION: 98 % | SYSTOLIC BLOOD PRESSURE: 108 MMHG | TEMPERATURE: 97.7 F | WEIGHT: 155 LBS | RESPIRATION RATE: 16 BRPM

## 2024-09-06 DIAGNOSIS — G43.009 MIGRAINE WITHOUT AURA AND WITHOUT STATUS MIGRAINOSUS, NOT INTRACTABLE: Primary | ICD-10-CM

## 2024-09-06 DIAGNOSIS — K52.9 ENTERITIS: ICD-10-CM

## 2024-09-06 LAB
ALBUMIN SERPL-MCNC: 4.6 G/DL (ref 3.5–5.2)
ALBUMIN/GLOB SERPL: 1.8 G/DL
ALP SERPL-CCNC: 75 U/L (ref 39–117)
ALT SERPL W P-5'-P-CCNC: 12 U/L (ref 1–33)
ANION GAP SERPL CALCULATED.3IONS-SCNC: 9 MMOL/L (ref 5–15)
AST SERPL-CCNC: 18 U/L (ref 1–32)
B-HCG UR QL: NEGATIVE
BACTERIA UR QL AUTO: ABNORMAL /HPF
BASOPHILS # BLD AUTO: 0.03 10*3/MM3 (ref 0–0.2)
BASOPHILS NFR BLD AUTO: 0.4 % (ref 0–1.5)
BILIRUB SERPL-MCNC: 0.8 MG/DL (ref 0–1.2)
BILIRUB UR QL STRIP: NEGATIVE
BUN SERPL-MCNC: 7 MG/DL (ref 6–20)
BUN/CREAT SERPL: 10.9 (ref 7–25)
CALCIUM SPEC-SCNC: 9.1 MG/DL (ref 8.6–10.5)
CHLORIDE SERPL-SCNC: 100 MMOL/L (ref 98–107)
CLARITY UR: ABNORMAL
CO2 SERPL-SCNC: 26 MMOL/L (ref 22–29)
COLOR UR: YELLOW
CREAT SERPL-MCNC: 0.64 MG/DL (ref 0.57–1)
DEPRECATED RDW RBC AUTO: 45.9 FL (ref 37–54)
EGFRCR SERPLBLD CKD-EPI 2021: 123.6 ML/MIN/1.73
EOSINOPHIL # BLD AUTO: 1.13 10*3/MM3 (ref 0–0.4)
EOSINOPHIL NFR BLD AUTO: 15 % (ref 0.3–6.2)
ERYTHROCYTE [DISTWIDTH] IN BLOOD BY AUTOMATED COUNT: 13.7 % (ref 12.3–15.4)
GLOBULIN UR ELPH-MCNC: 2.6 GM/DL
GLUCOSE SERPL-MCNC: 88 MG/DL (ref 65–99)
GLUCOSE UR STRIP-MCNC: NEGATIVE MG/DL
HCT VFR BLD AUTO: 40.8 % (ref 34–46.6)
HGB BLD-MCNC: 13.4 G/DL (ref 12–15.9)
HGB UR QL STRIP.AUTO: ABNORMAL
HOLD SPECIMEN: NORMAL
HYALINE CASTS UR QL AUTO: ABNORMAL /LPF
IMM GRANULOCYTES # BLD AUTO: 0 10*3/MM3 (ref 0–0.05)
IMM GRANULOCYTES NFR BLD AUTO: 0 % (ref 0–0.5)
KETONES UR QL STRIP: NEGATIVE
LEUKOCYTE ESTERASE UR QL STRIP.AUTO: NEGATIVE
LIPASE SERPL-CCNC: 44 U/L (ref 13–60)
LYMPHOCYTES # BLD AUTO: 2.39 10*3/MM3 (ref 0.7–3.1)
LYMPHOCYTES NFR BLD AUTO: 31.7 % (ref 19.6–45.3)
MCH RBC QN AUTO: 29.4 PG (ref 26.6–33)
MCHC RBC AUTO-ENTMCNC: 32.8 G/DL (ref 31.5–35.7)
MCV RBC AUTO: 89.5 FL (ref 79–97)
MONOCYTES # BLD AUTO: 0.71 10*3/MM3 (ref 0.1–0.9)
MONOCYTES NFR BLD AUTO: 9.4 % (ref 5–12)
NEUTROPHILS NFR BLD AUTO: 3.27 10*3/MM3 (ref 1.7–7)
NEUTROPHILS NFR BLD AUTO: 43.5 % (ref 42.7–76)
NITRITE UR QL STRIP: NEGATIVE
PH UR STRIP.AUTO: 5.5 [PH] (ref 5–8)
PLATELET # BLD AUTO: 355 10*3/MM3 (ref 140–450)
PMV BLD AUTO: 8.8 FL (ref 6–12)
POTASSIUM SERPL-SCNC: 3.7 MMOL/L (ref 3.5–5.2)
PROT SERPL-MCNC: 7.2 G/DL (ref 6–8.5)
PROT UR QL STRIP: NEGATIVE
RBC # BLD AUTO: 4.56 10*6/MM3 (ref 3.77–5.28)
RBC # UR STRIP: ABNORMAL /HPF
REF LAB TEST METHOD: ABNORMAL
SODIUM SERPL-SCNC: 135 MMOL/L (ref 136–145)
SP GR UR STRIP: 1.01 (ref 1–1.03)
SQUAMOUS #/AREA URNS HPF: ABNORMAL /HPF
UROBILINOGEN UR QL STRIP: ABNORMAL
WBC # UR STRIP: ABNORMAL /HPF
WBC NRBC COR # BLD AUTO: 7.53 10*3/MM3 (ref 3.4–10.8)
WHOLE BLOOD HOLD SPECIMEN: NORMAL

## 2024-09-06 PROCEDURE — 99284 EMERGENCY DEPT VISIT MOD MDM: CPT | Performed by: EMERGENCY MEDICINE

## 2024-09-06 PROCEDURE — 96375 TX/PRO/DX INJ NEW DRUG ADDON: CPT

## 2024-09-06 PROCEDURE — 25510000001 IOPAMIDOL PER 1 ML: Performed by: EMERGENCY MEDICINE

## 2024-09-06 PROCEDURE — 85025 COMPLETE CBC W/AUTO DIFF WBC: CPT

## 2024-09-06 PROCEDURE — 96361 HYDRATE IV INFUSION ADD-ON: CPT

## 2024-09-06 PROCEDURE — 99285 EMERGENCY DEPT VISIT HI MDM: CPT

## 2024-09-06 PROCEDURE — 83690 ASSAY OF LIPASE: CPT

## 2024-09-06 PROCEDURE — 25010000002 MORPHINE PER 10 MG: Performed by: EMERGENCY MEDICINE

## 2024-09-06 PROCEDURE — 25010000002 ONDANSETRON PER 1 MG: Performed by: EMERGENCY MEDICINE

## 2024-09-06 PROCEDURE — G0463 HOSPITAL OUTPT CLINIC VISIT: HCPCS | Performed by: EMERGENCY MEDICINE

## 2024-09-06 PROCEDURE — 81025 URINE PREGNANCY TEST: CPT | Performed by: EMERGENCY MEDICINE

## 2024-09-06 PROCEDURE — 80053 COMPREHEN METABOLIC PANEL: CPT

## 2024-09-06 PROCEDURE — 81001 URINALYSIS AUTO W/SCOPE: CPT | Performed by: EMERGENCY MEDICINE

## 2024-09-06 PROCEDURE — 25010000002 DROPERIDOL PER 5 MG: Performed by: EMERGENCY MEDICINE

## 2024-09-06 PROCEDURE — 25810000003 SODIUM CHLORIDE 0.9 % SOLUTION: Performed by: EMERGENCY MEDICINE

## 2024-09-06 PROCEDURE — 96374 THER/PROPH/DIAG INJ IV PUSH: CPT

## 2024-09-06 PROCEDURE — 74177 CT ABD & PELVIS W/CONTRAST: CPT

## 2024-09-06 RX ORDER — DROPERIDOL 2.5 MG/ML
1.25 INJECTION, SOLUTION INTRAMUSCULAR; INTRAVENOUS ONCE
Status: COMPLETED | OUTPATIENT
Start: 2024-09-06 | End: 2024-09-06

## 2024-09-06 RX ORDER — IOPAMIDOL 755 MG/ML
100 INJECTION, SOLUTION INTRAVASCULAR
Status: COMPLETED | OUTPATIENT
Start: 2024-09-06 | End: 2024-09-06

## 2024-09-06 RX ORDER — SODIUM CHLORIDE 0.9 % (FLUSH) 0.9 %
10 SYRINGE (ML) INJECTION AS NEEDED
Status: DISCONTINUED | OUTPATIENT
Start: 2024-09-06 | End: 2024-09-06 | Stop reason: HOSPADM

## 2024-09-06 RX ORDER — DIPHENOXYLATE HCL/ATROPINE 2.5-.025MG
2 TABLET ORAL 4 TIMES DAILY PRN
Qty: 16 TABLET | Refills: 0 | Status: SHIPPED | OUTPATIENT
Start: 2024-09-06

## 2024-09-06 RX ORDER — PROMETHAZINE HYDROCHLORIDE 25 MG/1
25 TABLET ORAL EVERY 6 HOURS PRN
Qty: 20 TABLET | Refills: 0 | Status: SHIPPED | OUTPATIENT
Start: 2024-09-06

## 2024-09-06 RX ORDER — ONDANSETRON 4 MG/1
4 TABLET, ORALLY DISINTEGRATING ORAL EVERY 6 HOURS PRN
Qty: 20 TABLET | Refills: 0 | Status: SHIPPED | OUTPATIENT
Start: 2024-09-06

## 2024-09-06 RX ORDER — MORPHINE SULFATE 4 MG/ML
4 INJECTION, SOLUTION INTRAMUSCULAR; INTRAVENOUS ONCE
Status: COMPLETED | OUTPATIENT
Start: 2024-09-06 | End: 2024-09-06

## 2024-09-06 RX ORDER — ONDANSETRON 2 MG/ML
4 INJECTION INTRAMUSCULAR; INTRAVENOUS ONCE
Status: COMPLETED | OUTPATIENT
Start: 2024-09-06 | End: 2024-09-06

## 2024-09-06 RX ADMIN — MORPHINE SULFATE 4 MG: 4 INJECTION, SOLUTION INTRAMUSCULAR; INTRAVENOUS at 17:10

## 2024-09-06 RX ADMIN — SODIUM CHLORIDE 1000 ML: 9 INJECTION, SOLUTION INTRAVENOUS at 17:10

## 2024-09-06 RX ADMIN — IOPAMIDOL 85 ML: 755 INJECTION, SOLUTION INTRAVENOUS at 17:23

## 2024-09-06 RX ADMIN — ONDANSETRON 4 MG: 2 INJECTION, SOLUTION INTRAMUSCULAR; INTRAVENOUS at 17:10

## 2024-09-06 RX ADMIN — DROPERIDOL 1.25 MG: 2.5 INJECTION, SOLUTION INTRAMUSCULAR; INTRAVENOUS at 18:13

## 2024-09-06 NOTE — FSED PROVIDER NOTE
EMERGENCY DEPARTMENT ENCOUNTER    Room Number:    Date seen:  2024  Time seen: 17:01 EDT  PCP: Julianna Pollock MD  Historian:     Discussed/obtained information from independent historians:     HPI:    Patient is a very nice 29-year-old female.  She presents with a 4-day history of intractable diarrhea.  No nausea or vomiting.  No fever.  No blood in her diarrhea.  She does also endorse bilateral lower quadrant abdominal pain.  She denies any known sick contacts.  No recent out of country travel.  No personal history of C. difficile colitis.  No history of diverticulitis.    External (non-ED) record review:        Chronic or social conditions impacting care:    ALLERGIES  Sulfa antibiotics    PAST MEDICAL HISTORY  Active Ambulatory Problems     Diagnosis Date Noted    Migraine without aura and without status migrainosus, not intractable 03/10/2023     Resolved Ambulatory Problems     Diagnosis Date Noted    No Resolved Ambulatory Problems     Past Medical History:   Diagnosis Date    Anxiety     Depression     Head injury     Headache, tension-type     Migraine        PAST SURGICAL HISTORY  History reviewed. No pertinent surgical history.    FAMILY HISTORY  Family History   Problem Relation Age of Onset    Hyperlipidemia Mother        SOCIAL HISTORY  Social History     Socioeconomic History    Marital status:    Tobacco Use    Smoking status: Former     Current packs/day: 0.00     Types: Cigarettes, Electronic Cigarette     Quit date: 2022     Years since quittin.0    Smokeless tobacco: Never   Vaping Use    Vaping status: Former   Substance and Sexual Activity    Alcohol use: Yes     Alcohol/week: 6.0 standard drinks of alcohol     Types: 3 Glasses of wine, 3 Drinks containing 0.5 oz of alcohol per week     Comment: socially    Drug use: Yes     Frequency: 7.0 times per week     Types: Marijuana    Sexual activity: Yes     Partners: Male     Birth control/protection:  I.U.D.       REVIEW OF SYSTEMS  Review of Systems    All systems reviewed and negative except for those discussed in HPI.       PHYSICAL EXAM    I have reviewed the triage vital signs and nursing notes.  Vitals:    09/06/24 1532   BP: 128/85   Pulse: 102   Resp: 16   Temp: 97.7 °F (36.5 °C)   SpO2: 98%     Physical Exam  Vital signs: Reviewed in nurses notes    General: Patient is awake alert no acute distress    HEENT: Normocephalic atraumatic nasopharynx clear of pharynx clear and moist  Neck:   Supple without lymphadenopathy    Respiratory:   Nonlabored respirations.  Clear to auscultation bilaterally.  Equal breath sounds bilaterally.  No wheezes or stridor noted.    Cardiovascular: Regular rate and rhythm.  No murmur.  Equal pulses in bilateral lower extremities without edema.    Abdomen: Soft nondistended bowel sounds present.  There is mild to moderate bilateral lower quadrant tenderness to deep palpation without guarding or rebound    Back: No true CVA tenderness.  There is some mild right paralumbar muscular tenderness    Skin:   Warm and dry.  No rashes noted    Neurological examination: Patient is awake alert oriented x4.  Speech is normal.  No facial palsy.  No focal motor or sensory deficits.    LAB RESULTS  Recent Results (from the past 24 hour(s))   Urinalysis without microscopic (no culture) - Urine, Clean Catch    Collection Time: 09/06/24  3:41 PM    Specimen: Urine, Clean Catch   Result Value Ref Range    Color, UA Yellow Yellow, Straw    Appearance, UA Turbid (A) Clear    pH, UA 5.5 5.0 - 8.0    Specific Gravity, UA 1.010 1.005 - 1.030    Glucose, UA Negative Negative    Ketones, UA Negative Negative    Bilirubin, UA Negative Negative    Blood, UA Small (1+) (A) Negative    Protein, UA Negative Negative    Leuk Esterase, UA Negative Negative    Nitrite, UA Negative Negative    Urobilinogen, UA 0.2 E.U./dL 0.2 - 1.0 E.U./dL   Pregnancy, Urine - Urine, Clean Catch    Collection Time: 09/06/24  3:41  PM    Specimen: Urine, Clean Catch   Result Value Ref Range    HCG, Urine QL Negative Negative   Comprehensive Metabolic Panel    Collection Time: 09/06/24  4:05 PM    Specimen: Blood   Result Value Ref Range    Glucose 88 65 - 99 mg/dL    BUN 7 6 - 20 mg/dL    Creatinine 0.64 0.57 - 1.00 mg/dL    Sodium 135 (L) 136 - 145 mmol/L    Potassium 3.7 3.5 - 5.2 mmol/L    Chloride 100 98 - 107 mmol/L    CO2 26.0 22.0 - 29.0 mmol/L    Calcium 9.1 8.6 - 10.5 mg/dL    Total Protein 7.2 6.0 - 8.5 g/dL    Albumin 4.6 3.5 - 5.2 g/dL    ALT (SGPT) 12 1 - 33 U/L    AST (SGOT) 18 1 - 32 U/L    Alkaline Phosphatase 75 39 - 117 U/L    Total Bilirubin 0.8 0.0 - 1.2 mg/dL    Globulin 2.6 gm/dL    A/G Ratio 1.8 g/dL    BUN/Creatinine Ratio 10.9 7.0 - 25.0    Anion Gap 9.0 5.0 - 15.0 mmol/L    eGFR 123.6 >60.0 mL/min/1.73   Lipase    Collection Time: 09/06/24  4:05 PM    Specimen: Blood   Result Value Ref Range    Lipase 44 13 - 60 U/L   Green Top (Gel)    Collection Time: 09/06/24  4:05 PM   Result Value Ref Range    Extra Tube Hold for add-ons.    Lavender Top    Collection Time: 09/06/24  4:05 PM   Result Value Ref Range    Extra Tube hold for add-on    CBC Auto Differential    Collection Time: 09/06/24  4:05 PM    Specimen: Blood   Result Value Ref Range    WBC 7.53 3.40 - 10.80 10*3/mm3    RBC 4.56 3.77 - 5.28 10*6/mm3    Hemoglobin 13.4 12.0 - 15.9 g/dL    Hematocrit 40.8 34.0 - 46.6 %    MCV 89.5 79.0 - 97.0 fL    MCH 29.4 26.6 - 33.0 pg    MCHC 32.8 31.5 - 35.7 g/dL    RDW 13.7 12.3 - 15.4 %    RDW-SD 45.9 37.0 - 54.0 fl    MPV 8.8 6.0 - 12.0 fL    Platelets 355 140 - 450 10*3/mm3    Neutrophil % 43.5 42.7 - 76.0 %    Lymphocyte % 31.7 19.6 - 45.3 %    Monocyte % 9.4 5.0 - 12.0 %    Eosinophil % 15.0 (H) 0.3 - 6.2 %    Basophil % 0.4 0.0 - 1.5 %    Immature Grans % 0.0 0.0 - 0.5 %    Neutrophils, Absolute 3.27 1.70 - 7.00 10*3/mm3    Lymphocytes, Absolute 2.39 0.70 - 3.10 10*3/mm3    Monocytes, Absolute 0.71 0.10 - 0.90 10*3/mm3     Eosinophils, Absolute 1.13 (H) 0.00 - 0.40 10*3/mm3    Basophils, Absolute 0.03 0.00 - 0.20 10*3/mm3    Immature Grans, Absolute 0.00 0.00 - 0.05 10*3/mm3       Ordered the above labs and independently interpreted results.  My findings will be discussed in the ED course or medical decision making section below      PROCEDURES:  Procedures      RADIOLOGY RESULTS  CT Abdomen Pelvis With Contrast    Result Date: 9/6/2024  CT ABDOMEN AND PELVIS WITH IV CONTRAST  HISTORY: Diarrhea, lower abdominal pain  TECHNIQUE: Radiation dose reduction techniques were utilized, including automated exposure control and exposure modulation based on body size. Axial images were obtained through the abdomen and pelvis after the administration of IV contrast. Coronal and sagittal reformatted images obtained.  COMPARISON: None available  FINDINGS:  ABDOMEN: The lung bases are clear. Liver, gallbladder and spleen are unremarkable. The kidneys, adrenal glands and pancreas are unremarkable. No evidence of small bowel obstruction. There are a few fluid-filled nondilated loops of small bowel that are present, nonspecific but can be seen with enteritis.  PELVIS: Bladder is unremarkable. IUD. Small right ovarian cysts. The colon is unremarkable. The appendix is normal. No acute bony abnormality      1. No evidence of colitis 2. No evidence for bowel obstruction. There are a few fluid-filled nondilated loops of small bowel which are nonspecific but can be seen with enteritis  Radiation dose reduction techniques were utilized, including automated exposure control and exposure modulation based on body size.   This report was finalized on 9/6/2024 5:38 PM by Dr. Cl Magana M.D on Workstation: [id number]        Ordered the above noted radiological studies.  Independently interpreted by me.  My findings will be discussed in the medical decision section below.     PROGRESS, DATA ANALYSIS, CONSULTS AND MEDICAL DECISION MAKING    Please note  that this section constitutes my independent interpretation of clinical data including lab results, radiology, EKG's.  This constitutes my independent professional opinion regarding differential diagnosis and management of this patient.  It may include any factors such as history from outside sources, review of external records, social determinants of health, management of medications, response to those treatments, and discussions with other providers.       Orders placed during this visit:  Orders Placed This Encounter   Procedures    Gastrointestinal Panel, PCR - Stool, Per Rectum    Clostridioides difficile Toxin - Stool, Per Rectum    Clostridioides difficile Toxin, PCR - Stool, Per Rectum    CT Abdomen Pelvis With Contrast    Urinalysis without microscopic (no culture) - Urine, Clean Catch    D Hanis Draw    Comprehensive Metabolic Panel    Lipase    CBC Auto Differential    Urinalysis, Microscopic Only - Urine, Clean Catch    Pregnancy, Urine - Urine, Clean Catch    NPO Diet NPO Type: Strict NPO    Undress & Gown    Patient Isolation Contact Spore    Insert Peripheral IV    CBC & Differential    Green Top (Gel)    Lavender Top    ED Acknowledgement Form Needed;       Medications   sodium chloride 0.9 % flush 10 mL (has no administration in time range)   sodium chloride 0.9 % bolus 1,000 mL (1,000 mL Intravenous New Bag 9/6/24 1710)   Morphine sulfate (PF) injection 4 mg (4 mg Intravenous Given 9/6/24 1710)   ondansetron (ZOFRAN) injection 4 mg (4 mg Intravenous Given 9/6/24 1710)   iopamidol (ISOVUE-370) 76 % injection 100 mL (85 mL Intravenous Given 9/6/24 1723)   droperidol (INAPSINE) injection 1.25 mg (1.25 mg Intravenous Given 9/6/24 1813)            Medical Decision Making  Problems Addressed:  Enteritis: complicated acute illness or injury  Migraine without aura and without status migrainosus, not intractable: complicated acute illness or injury    Amount and/or Complexity of Data Reviewed  Labs:  ordered.  Radiology: ordered.    Risk  Prescription drug management.            DIAGNOSIS  Final diagnoses:   Migraine without aura and without status migrainosus, not intractable   Enteritis          Medication List        New Prescriptions      diphenoxylate-atropine 2.5-0.025 MG per tablet  Commonly known as: LOMOTIL  Take 2 tablets by mouth 4 (Four) Times a Day As Needed for Diarrhea for up to 16 doses.     ondansetron ODT 4 MG disintegrating tablet  Commonly known as: ZOFRAN-ODT  Place 1 tablet on the tongue Every 6 (Six) Hours As Needed for Vomiting or Nausea.     promethazine 25 MG tablet  Commonly known as: PHENERGAN  Take 1 tablet by mouth Every 6 (Six) Hours As Needed for Nausea or Vomiting.               Where to Get Your Medications        These medications were sent to [facility] [id number] - [place], [place] - [address] [facility] [facility] [address] [place] - [phone]  - [phone] FX  [address] [address]      Phone: [phone]   diphenoxylate-atropine 2.5-0.025 MG per tablet  ondansetron ODT 4 MG disintegrating tablet  promethazine 25 MG tablet         FOLLOW-UP  Julianna Pollock MD  [id number]  [place] [id number] [phone]      As needed        Latest Documented Vital Signs:  As of 18:15 EDT  BP- 128/85 HR- 102 Temp- 97.7 °F (36.5 °C) O2 sat- 98%    Appropriate PPE utilized throughout this patient encounter to include mask, if indicated, per current protocol. Hand hygiene was performed before donning PPE and after removal when leaving the room.    Please note that portions of this were completed with a voice recognition program.     Note Disclaimer: At [facility], we believe that sharing information builds trust and better relationships. You are receiving this note because you are receiving care at [facility] or recently visited. It is possible you will see health information before a provider has talked with you about it. This kind of  information can be easy to misunderstand. To help you fully understand what it means for your health, we urge you to discuss this note with your provider.

## 2024-09-06 NOTE — TELEPHONE ENCOUNTER
Reason for Disposition   Back pain    Additional Information   Negative: Passed out (i.e., fainted, collapsed and was not responding)   Negative: Shock suspected (e.g., cold/pale/clammy skin, too weak to stand, low BP, rapid pulse)   Negative: Sounds like a life-threatening emergency to the triager   Negative: Major injury to the back (e.g., MVA, fall > 10 feet or 3 meters, penetrating injury, etc.)   Negative: Pain in the upper back over the ribs (rib cage) that radiates (travels) into the chest   Negative: Pain in the upper back over the ribs (rib cage) and worsened by coughing (or clearly increases with breathing)   Negative: Back pain during pregnancy   Negative: SEVERE back pain of sudden onset and age > 60 years   Negative: SEVERE abdominal pain (e.g., excruciating)   Negative: Abdominal pain and age > 60 years   Negative: Unable to urinate (or only a few drops) and bladder feels very full   Negative: Loss of bladder or bowel control (urine or bowel incontinence; wetting self, leaking stool) of new-onset   Negative: Numbness (loss of sensation) in groin or rectal area   Negative: Pain radiates into groin, scrotum   Negative: Blood in urine (red, pink, or tea-colored)   Negative: Vomiting and pain over lower ribs of back (i.e., flank - kidney area)   Negative: Weakness of a leg or foot (e.g., unable to bear weight, dragging foot)   Negative: Patient sounds very sick or weak to the triager   Negative: Fever > 100.4 F (38.0 C) and flank pain   Negative: Pain or burning with passing urine (urination)   Negative: SEVERE back pain (e.g., excruciating, unable to do any normal activities) and not improved after pain medicine and CARE ADVICE   Negative: Numbness in an arm or hand (i.e., loss of sensation) and upper back pain   Negative: Numbness in a leg or foot (i.e., loss of sensation)   Negative: High-risk adult (e.g., history of cancer, history of HIV, or history of IV Drug Use)   Negative: Soft tissue infection  "(e.g., abscess, cellulitis) or other serious infection (e.g., bacteremia) in last 2 weeks   Negative: Painful rash with multiple small blisters grouped together (i.e., dermatomal distribution or 'band' or 'stripe')   Negative: Pain radiates into the thigh or further down the leg, and in both legs   Negative: Age > 50 and no history of prior similar back pain   Negative: MODERATE back pain (e.g., interferes with normal activities) and present > 3 days   Negative: Pain radiates into the thigh or further down the leg   Negative: Patient wants to be seen   Negative: Back pain lasts > 2 weeks   Negative: Back pain is a chronic symptom (recurrent or ongoing AND lasting > 4 weeks)   Negative: Caused by a twisting, bending, or lifting injury   Negative: Caused by overuse from recent vigorous activity (e.g., exercise, gardening, lifting and carrying, sports)    Answer Assessment - Initial Assessment Questions  1. ONSET: \"When did the pain begin?\"       yesterday  2. LOCATION: \"Where does it hurt?\" (upper, mid or lower back)      Right lower  3. SEVERITY: \"How bad is the pain?\"  (e.g., Scale 1-10; mild, moderate, or severe)    - MILD (1-3): Doesn't interfere with normal activities.     - MODERATE (4-7): Interferes with normal activities or awakens from sleep.     - SEVERE (8-10): Excruciating pain, unable to do any normal activities.       moderate  4. PATTERN: \"Is the pain constant?\" (e.g., yes, no; constant, intermittent)       intermitten  5. RADIATION: \"Does the pain shoot into your legs or somewhere else?\"      no  6. CAUSE:  \"What do you think is causing the back pain?\"       unknown  7. BACK OVERUSE:  \"Any recent lifting of heavy objects, strenuous work or exercise?\"      no  8. MEDICINES: \"What have you taken so far for the pain?\" (e.g., nothing, acetaminophen, NSAIDS)      No new  9. NEUROLOGIC SYMPTOMS: \"Do you have any weakness, numbness, or problems with bowel/bladder control?\"      no  10. OTHER SYMPTOMS: \"Do you " "have any other symptoms?\" (e.g., fever, abdomen pain, burning with urination, blood in urine)        Denies any burning with urination, no blood in urine.  No fever or vomiting  11. PREGNANCY: \"Is there any chance you are pregnant?\" \"When was your last menstrual period?\"        LMP 3 weeks has IUD    Protocols used: Back Pain-ADULT-[place]    "

## 2024-09-06 NOTE — DISCHARGE INSTRUCTIONS
Today your CAT scan is reassuring.  There is no evidence of bowel obstruction, colitis, or appendicitis.    Please look over the handouts on both food choices for the diarrhea as well as for nausea.  I sent in a very strong medication that is excellent for diarrhea and cramping.  Also send into antinausea medications    Hopefully we get you feeling much better before Sunday.    Return for any difficulties    Please read all of the instructions in this handout.  If you receive prescriptions please fill them and take them as directed.  Please call your primary care physician for follow-up appointment in the next 5 to 7 days.  If you do not have a physician you may call the [facility] referral line at [phone].    You may return to the emergency department at any time for any concerns such as worsening symptoms.  If you received a work or school note it will be printed at the back of this packet.

## 2024-12-21 DIAGNOSIS — G43.009 MIGRAINE WITHOUT AURA AND WITHOUT STATUS MIGRAINOSUS, NOT INTRACTABLE: ICD-10-CM

## 2024-12-23 RX ORDER — SUMATRIPTAN SUCCINATE 100 MG/1
TABLET ORAL
Qty: 12 TABLET | Refills: 0 | Status: SHIPPED | OUTPATIENT
Start: 2024-12-23

## 2025-01-03 ENCOUNTER — OFFICE VISIT (OUTPATIENT)
Dept: NEUROLOGY | Facility: CLINIC | Age: 30
End: 2025-01-03
Payer: COMMERCIAL

## 2025-01-03 VITALS
OXYGEN SATURATION: 96 % | BODY MASS INDEX: 29.57 KG/M2 | SYSTOLIC BLOOD PRESSURE: 118 MMHG | HEART RATE: 108 BPM | HEIGHT: 66 IN | WEIGHT: 184 LBS | DIASTOLIC BLOOD PRESSURE: 78 MMHG

## 2025-01-03 DIAGNOSIS — G43.009 MIGRAINE WITHOUT AURA AND WITHOUT STATUS MIGRAINOSUS, NOT INTRACTABLE: Primary | ICD-10-CM

## 2025-01-03 PROCEDURE — 99213 OFFICE O/P EST LOW 20 MIN: CPT | Performed by: PSYCHIATRY & NEUROLOGY

## 2025-01-03 RX ORDER — SERTRALINE HYDROCHLORIDE 25 MG/1
25 TABLET, FILM COATED ORAL DAILY
COMMUNITY
Start: 2024-09-25

## 2025-01-03 RX ORDER — SUMATRIPTAN SUCCINATE 100 MG/1
TABLET ORAL
Qty: 12 TABLET | Refills: 2 | Status: SHIPPED | OUTPATIENT
Start: 2025-01-03

## 2025-01-03 RX ORDER — TRAZODONE HYDROCHLORIDE 50 MG/1
1 TABLET, FILM COATED ORAL NIGHTLY
COMMUNITY
Start: 2024-10-23

## 2025-01-03 NOTE — ASSESSMENT & PLAN NOTE
29 year old right handed woman with migraines which had responded well to Botox but unfortunately insurance out of pocket cost was too high so we had to switch to Ajovy which was also helpful but she has discontinued this medicine following her last dose on 7/14/2024 and doing well at this time.  She reports a history of migraines starting in 2021.  She would lose vision and then fade back in depending on how long the ocular migraines lasted.  She tells me she started getting Botox injections for migraines which are very helpful but unfortunately the insurance is not covering very much of this medicine so she has a lot of out of pocket cost.  Of note she has been tried on topiramate, trazodone and was previously on lamotrigine and currently on buspirone and propranolol and sertraline.  She has also tried sumatriptan for acute treatment of her migraines which makes her sleepy but does help with her headaches when needed.  She has had normal brain MRI and MRA in 8/2021.  She reports having her eyes checked by ophthalmology who also felt that she was having migraine auras.  She was on the Botox for migraines but unfortunately she cannot afford to continue this even though it led to reduction of 80%+ and sumatriptan 100 mg is helping more than the rizatriptan was in the past.  She is only getting 2 migraine days per month which are responding to the sumatriptan as needed most recently and has discontinued the Ajovy in thought of getting pregnant potentially in the future.  She has an IUD and not planning on getting pregnant anytime soon.  She is happy with just using the sumatriptan as needed at this time.   I will continue the sumatriptan as needed.  Advised her to stop this medicine if she were to get pregnant.  Also discussed potential risk for serotonin syndrome and symptoms and advised her to be taken to the ED with these symptoms.

## 2025-01-03 NOTE — PROGRESS NOTES
Chief Complaint  Migraine (About 2 migraines a month)    Subjective          Colleen Walker presents to [facility] for   HISTORY OF PRESENT ILLNESS:    Colleen Walker is a 29 year old right handed woman here for follow up evaluation and treatment of migraines which had responded well to Botox but unfortunately insurance out of pocket cost was too high so we had to switch to Ajovy which was also helpful but she has discontinued this medicine following her last dose on 7/14/2024 and doing well at this time.  She reports a history of migraines starting in 2021.  She would lose vision and then fade back in depending on how long the ocular migraines lasted.  She tells me she started getting Botox injections for migraines which are very helpful but unfortunately the insurance is not covering very much of this medicine so she has a lot of out of pocket cost.  Of note she has been tried on topiramate, trazodone and was previously on lamotrigine and currently on buspirone and propranolol and sertraline.  She has also tried sumatriptan for acute treatment of her migraines which makes her sleepy but does help with her headaches when needed.  She has had normal brain MRI and MRA in 8/2021.  She reports having her eyes checked by ophthalmology who also felt that she was having migraine auras.  She was on the Botox for migraines but unfortunately she cannot afford to continue this even though it led to reduction of 80%+ and sumatriptan 100 mg is helping more than the rizatriptan was in the past.  She is only getting 2 migraine days per month which are responding to the sumatriptan as needed most recently and has discontinued the Ajovy in thought of getting pregnant potentially in the future.  She has an IUD and not planning on getting pregnant anytime soon.  She is happy with just using the sumatriptan as needed at this time.     Past Medical History:   Diagnosis Date    Anxiety     Depression     Head  "injury 2010    Concussion    Headache, tension-type     Migraine     Aura Migraines        Family History   Problem Relation Age of Onset    Hyperlipidemia Mother         Social History     Socioeconomic History    Marital status:    Tobacco Use    Smoking status: Former     Current packs/day: 0.00     Types: Cigarettes, Electronic Cigarette     Quit date: 2022     Years since quittin.3    Smokeless tobacco: Never   Vaping Use    Vaping status: Former   Substance and Sexual Activity    Alcohol use: Yes     Alcohol/week: 6.0 standard drinks of alcohol     Types: 3 Glasses of wine, 3 Drinks containing 0.5 oz of alcohol per week     Comment: socially    Drug use: Yes     Frequency: 7.0 times per week     Types: Marijuana    Sexual activity: Yes     Partners: Male     Birth control/protection: I.U.D.        I have reviewed and confirmed the accuracy of the ROS as documented by the MA/LPN/RN Nicki Barone MD   Review of Systems   Neurological:  Positive for headache. Negative for dizziness, tremors, seizures, syncope, facial asymmetry, speech difficulty, weakness, light-headedness, numbness, memory problem and confusion.   Psychiatric/Behavioral:  Negative for agitation, behavioral problems, decreased concentration, dysphoric mood, hallucinations, self-injury, sleep disturbance, suicidal ideas, negative for hyperactivity, depressed mood and stress. The patient is not nervous/anxious.         Objective   Vital Signs:   /78   Pulse 108   Ht 167.6 cm (65.98\")   Wt 83.5 kg (184 lb)   SpO2 96%   BMI 29.71 kg/m²       PHYSICAL EXAM:    General   Mental Status - Alert. General Appearance - Well developed, Well groomed, Oriented and Cooperative. Orientation - Oriented X3.       Head and Neck  Head - normocephalic, atraumatic with no lesions or palpable masses.  Neck    Global Assessment - supple.       Eye   Sclera/Conjunctiva - Bilateral - Normal.    ENMT  Mouth and Throat   Oral " Cavity/Oropharynx: Oropharynx - the soft palate,uvula and tongue are normal in appearance.    Chest and Lung Exam   Chest - lung clear to auscultation bilaterally.    Cardiovascular   Cardiovascular examination reveals  - normal heart sounds, regular rate and rhythm.    Neurologic   Mental Status: Speech - Normal. Cognitive function - appropriate fund of knowledge. No impairment of attention, Impairment of concentration, impairment of long term memory or impairment of short term memory.  Cranial Nerves:   II Optic: Visual acuity - Left - Normal. Right - Normal. Visual fields - Normal (to confrontation).  III Oculomotor: Pupillary constriction - Left - Normal. Right - Normal.  VII Facial: - Normal Bilaterally.   IX Glossopharyngeal / X Vagus - Normal.  XI Accessory: Trapezius - Bilateral - Normal. Sternocleidomastoid - Bilateral - Normal.  XII Hypoglossal - Bilateral - Normal.  Eye Movements: - Normal Bilaterally.  Sensory:   Light Touch: Intact - Globally.  Motor:   Bulk and Contour: - Normal.  Tone: - Normal.  Tremor: Not present.  Strength: 5/5 normal muscle strength - All Muscles.   General Assessment of Reflexes: - deep tendon reflexes are normal. Coordination - No Impairment of finger-to-nose or Impairment of rapid alternating movements. Gait - Normal.       Result Review :                 Assessment and Plan    Problem List Items Addressed This Visit       Migraine without aura and without status migrainosus, not intractable - Primary    Current Assessment & Plan     29 year old right handed woman with migraines which had responded well to Botox but unfortunately insurance out of pocket cost was too high so we had to switch to Ajovy which was also helpful but she has discontinued this medicine following her last dose on 7/14/2024 and doing well at this time.  She reports a history of migraines starting in 2021.  She would lose vision and then fade back in depending on how long the ocular migraines lasted.  She  tells me she started getting Botox injections for migraines which are very helpful but unfortunately the insurance is not covering very much of this medicine so she has a lot of out of pocket cost.  Of note she has been tried on topiramate, trazodone and was previously on lamotrigine and currently on buspirone and propranolol and sertraline.  She has also tried sumatriptan for acute treatment of her migraines which makes her sleepy but does help with her headaches when needed.  She has had normal brain MRI and MRA in 8/2021.  She reports having her eyes checked by ophthalmology who also felt that she was having migraine auras.  She was on the Botox for migraines but unfortunately she cannot afford to continue this even though it led to reduction of 80%+ and sumatriptan 100 mg is helping more than the rizatriptan was in the past.  She is only getting 2 migraine days per month which are responding to the sumatriptan as needed most recently and has discontinued the Ajovy in thought of getting pregnant potentially in the future.  She has an IUD and not planning on getting pregnant anytime soon.  She is happy with just using the sumatriptan as needed at this time.   I will continue the sumatriptan as needed.  Advised her to stop this medicine if she were to get pregnant.  Also discussed potential risk for serotonin syndrome and symptoms and advised her to be taken to the ED with these symptoms.           Relevant Medications    sertraline (ZOLOFT) 25 MG tablet    traZODone (DESYREL) 50 MG tablet    SUMAtriptan (IMITREX) 100 MG tablet       Follow Up   Return in about 6 months (around 7/3/2025).  Patient was given instructions and counseling regarding her condition or for health maintenance advice. Please see specific information pulled into the AVS if appropriate.